# Patient Record
Sex: FEMALE | Race: BLACK OR AFRICAN AMERICAN | HISPANIC OR LATINO | Employment: FULL TIME | URBAN - METROPOLITAN AREA
[De-identification: names, ages, dates, MRNs, and addresses within clinical notes are randomized per-mention and may not be internally consistent; named-entity substitution may affect disease eponyms.]

---

## 2020-08-10 ENCOUNTER — HOSPITAL ENCOUNTER (EMERGENCY)
Facility: HOSPITAL | Age: 50
Discharge: HOME | End: 2020-08-10
Attending: EMERGENCY MEDICINE | Admitting: EMERGENCY MEDICINE

## 2020-08-10 VITALS
DIASTOLIC BLOOD PRESSURE: 80 MMHG | OXYGEN SATURATION: 100 % | RESPIRATION RATE: 16 BRPM | HEART RATE: 62 BPM | TEMPERATURE: 98.8 F | WEIGHT: 234 LBS | SYSTOLIC BLOOD PRESSURE: 144 MMHG

## 2020-08-10 DIAGNOSIS — R11.0 NAUSEA: Primary | ICD-10-CM

## 2020-08-10 DIAGNOSIS — E87.1 HYPONATREMIA: ICD-10-CM

## 2020-08-10 DIAGNOSIS — D64.9 ANEMIA: ICD-10-CM

## 2020-08-10 LAB
ALBUMIN SERPL BCP-MCNC: 3.8 G/DL (ref 3.5–5)
ALP SERPL-CCNC: 117 U/L (ref 46–116)
ALT SERPL W P-5'-P-CCNC: 18 U/L (ref 12–78)
ANION GAP SERPL CALCULATED.3IONS-SCNC: 5 MMOL/L (ref 4–13)
AST SERPL W P-5'-P-CCNC: 17 U/L (ref 5–45)
BASOPHILS # BLD AUTO: 0.04 THOUSANDS/ΜL (ref 0–0.1)
BASOPHILS NFR BLD AUTO: 0 % (ref 0–1)
BILIRUB SERPL-MCNC: 0.26 MG/DL (ref 0.2–1)
BILIRUB UR QL STRIP: NEGATIVE
BUN SERPL-MCNC: 10 MG/DL (ref 5–25)
CALCIUM SERPL-MCNC: 8.9 MG/DL (ref 8.3–10.1)
CHLORIDE SERPL-SCNC: 98 MMOL/L (ref 100–108)
CLARITY UR: NORMAL
CO2 SERPL-SCNC: 28 MMOL/L (ref 21–32)
COLOR UR: YELLOW
CREAT SERPL-MCNC: 0.78 MG/DL (ref 0.6–1.3)
EOSINOPHIL # BLD AUTO: 0.32 THOUSAND/ΜL (ref 0–0.61)
EOSINOPHIL NFR BLD AUTO: 3 % (ref 0–6)
ERYTHROCYTE [DISTWIDTH] IN BLOOD BY AUTOMATED COUNT: 17.5 % (ref 11.6–15.1)
EXT PREG TEST URINE: NEGATIVE
EXT. CONTROL ED NAV: NORMAL
GFR SERPL CREATININE-BSD FRML MDRD: 104 ML/MIN/1.73SQ M
GLUCOSE SERPL-MCNC: 88 MG/DL (ref 65–140)
GLUCOSE UR STRIP-MCNC: NEGATIVE MG/DL
HCT VFR BLD AUTO: 34.3 % (ref 34.8–46.1)
HGB BLD-MCNC: 9.7 G/DL (ref 11.5–15.4)
HGB UR QL STRIP.AUTO: NEGATIVE
IMM GRANULOCYTES # BLD AUTO: 0.02 THOUSAND/UL (ref 0–0.2)
IMM GRANULOCYTES NFR BLD AUTO: 0 % (ref 0–2)
KETONES UR STRIP-MCNC: NEGATIVE MG/DL
LEUKOCYTE ESTERASE UR QL STRIP: NEGATIVE
LIPASE SERPL-CCNC: 137 U/L (ref 73–393)
LYMPHOCYTES # BLD AUTO: 2.19 THOUSANDS/ΜL (ref 0.6–4.47)
LYMPHOCYTES NFR BLD AUTO: 19 % (ref 14–44)
MCH RBC QN AUTO: 19.9 PG (ref 26.8–34.3)
MCHC RBC AUTO-ENTMCNC: 28.3 G/DL (ref 31.4–37.4)
MCV RBC AUTO: 70 FL (ref 82–98)
MONOCYTES # BLD AUTO: 0.77 THOUSAND/ΜL (ref 0.17–1.22)
MONOCYTES NFR BLD AUTO: 7 % (ref 4–12)
NEUTROPHILS # BLD AUTO: 8.04 THOUSANDS/ΜL (ref 1.85–7.62)
NEUTS SEG NFR BLD AUTO: 71 % (ref 43–75)
NITRITE UR QL STRIP: NEGATIVE
NRBC BLD AUTO-RTO: 0 /100 WBCS
PH UR STRIP.AUTO: 7 [PH] (ref 4.5–8)
PLATELET # BLD AUTO: 459 THOUSANDS/UL (ref 149–390)
PMV BLD AUTO: 10.6 FL (ref 8.9–12.7)
POTASSIUM SERPL-SCNC: 3.6 MMOL/L (ref 3.5–5.3)
PROT SERPL-MCNC: 8.3 G/DL (ref 6.4–8.2)
PROT UR STRIP-MCNC: NEGATIVE MG/DL
RBC # BLD AUTO: 4.88 MILLION/UL (ref 3.81–5.12)
SODIUM SERPL-SCNC: 131 MMOL/L (ref 136–145)
SP GR UR STRIP.AUTO: 1.02 (ref 1–1.03)
UROBILINOGEN UR QL STRIP.AUTO: 0.2 E.U./DL
WBC # BLD AUTO: 11.38 THOUSAND/UL (ref 4.31–10.16)

## 2020-08-10 PROCEDURE — 83690 ASSAY OF LIPASE: CPT | Performed by: EMERGENCY MEDICINE

## 2020-08-10 PROCEDURE — 81025 URINE PREGNANCY TEST: CPT | Performed by: PHYSICIAN ASSISTANT

## 2020-08-10 PROCEDURE — 85025 COMPLETE CBC W/AUTO DIFF WBC: CPT | Performed by: EMERGENCY MEDICINE

## 2020-08-10 PROCEDURE — 83540 ASSAY OF IRON: CPT | Performed by: PHYSICIAN ASSISTANT

## 2020-08-10 PROCEDURE — 36415 COLL VENOUS BLD VENIPUNCTURE: CPT

## 2020-08-10 PROCEDURE — 99284 EMERGENCY DEPT VISIT MOD MDM: CPT | Performed by: PHYSICIAN ASSISTANT

## 2020-08-10 PROCEDURE — 83550 IRON BINDING TEST: CPT | Performed by: PHYSICIAN ASSISTANT

## 2020-08-10 PROCEDURE — 99284 EMERGENCY DEPT VISIT MOD MDM: CPT

## 2020-08-10 PROCEDURE — 81003 URINALYSIS AUTO W/O SCOPE: CPT

## 2020-08-10 PROCEDURE — 82728 ASSAY OF FERRITIN: CPT | Performed by: PHYSICIAN ASSISTANT

## 2020-08-10 PROCEDURE — 80053 COMPREHEN METABOLIC PANEL: CPT | Performed by: EMERGENCY MEDICINE

## 2020-08-10 RX ORDER — ONDANSETRON 4 MG/1
4 TABLET, FILM COATED ORAL EVERY 6 HOURS
Qty: 12 TABLET | Refills: 0 | Status: SHIPPED | OUTPATIENT
Start: 2020-08-10

## 2020-08-10 RX ORDER — FERROUS SULFATE 325(65) MG
325 TABLET ORAL DAILY
Qty: 30 TABLET | Refills: 0 | Status: SHIPPED | OUTPATIENT
Start: 2020-08-10

## 2020-08-10 RX ORDER — ONDANSETRON 4 MG/1
4 TABLET, ORALLY DISINTEGRATING ORAL ONCE
Status: COMPLETED | OUTPATIENT
Start: 2020-08-10 | End: 2020-08-10

## 2020-08-10 RX ORDER — ONDANSETRON 4 MG/1
4 TABLET, FILM COATED ORAL EVERY 6 HOURS
Qty: 12 TABLET | Refills: 0 | Status: SHIPPED | OUTPATIENT
Start: 2020-08-10 | End: 2020-08-10 | Stop reason: SDUPTHER

## 2020-08-10 RX ADMIN — ONDANSETRON 4 MG: 4 TABLET, ORALLY DISINTEGRATING ORAL at 18:20

## 2020-08-10 NOTE — ED PROVIDER NOTES
History  Chief Complaint   Patient presents with    Abdominal Pain     Pt reports feeling nausea and a upset stomach that started last night  Pt did not try any medications to help with the pain  Pt wants to be tested for covid and also reports "spots on her knee"     Patient present and emergency room with a 2 hour history of lower abdominal pain and nausea  She denies any urinary frequency, urgency, dysuria, hematuria  It has been moving her bowels normally  No blood or mucus  She complains of an itchy throat but no sore throat pain  She denies any coughing symptoms, nasal congestion or postnasal drip  She denies any your pain  He describes the pain as a generalized cramping at times that waxes and wanes  Patient has a past surgical history of a previous  9 years ago  She states there is no chance she is pregnant as she feels that she has gone through menopause  Past medical history is negative  Patient was questioning the need for a coronavirus test   She has no symptoms and has not had any exposure recently to any corona patient's  Her last exposure was 3 weeks ago and she is asymptomatic      History provided by:  Patient   used: Yes ([phone] -Pad)    Nausea   The primary symptoms include abdominal pain, nausea and arthralgias  Primary symptoms do not include fever, weight loss, fatigue, vomiting, diarrhea, melena, hematemesis, jaundice, hematochezia, dysuria, myalgias or rash  The illness began today  The onset was gradual    The abdominal pain began today  Pain location: Lower  The abdominal pain does not radiate  The abdominal pain is relieved by nothing  The illness does not include chills, anorexia, dysphagia, odynophagia, bloating, constipation, tenesmus or itching   Associated medical issues do not include inflammatory bowel disease, GERD, gallstones, liver disease, alcohol abuse, PUD, gastric bypass, bowel resection, irritable bowel syndrome, hemorrhoids or diverticulitis  Risk factors for a gastrointestinal illness include travel to endemic areas, high risk sexual activity, alcohol use and intravenous drug use  None       History reviewed  No pertinent past medical history  Past Surgical History:   Procedure Laterality Date     SECTION         History reviewed  No pertinent family history  I have reviewed and agree with the history as documented  E-Cigarette/Vaping    E-Cigarette Use Never User      E-Cigarette/Vaping Substances     Social History     Tobacco Use    Smoking status: Never Smoker    Smokeless tobacco: Never Used   Substance Use Topics    Alcohol use: Never     Frequency: Never    Drug use: Never       Review of Systems   Constitutional: Negative for activity change, appetite change, chills, diaphoresis, fatigue, fever and weight loss  HENT: Negative for congestion, dental problem, ear discharge, ear pain, mouth sores, postnasal drip, rhinorrhea, sore throat and trouble swallowing  Eyes: Negative for pain, discharge, redness and itching  Respiratory: Negative for cough, chest tightness, shortness of breath and wheezing  Cardiovascular: Negative for chest pain and leg swelling  Gastrointestinal: Positive for abdominal pain and nausea  Negative for anorexia, bloating, constipation, diarrhea, dysphagia, hematemesis, hematochezia, jaundice, melena and vomiting  Genitourinary: Negative for dysuria, frequency, hematuria and urgency  Musculoskeletal: Positive for arthralgias  Negative for myalgias  Skin: Negative for color change, itching and rash  Patient occasionally has a rash on both flexor surfaces of both elbows and knees  Neurological: Negative for weakness  Psychiatric/Behavioral: Negative for confusion  All other systems reviewed and are negative  Physical Exam  Physical Exam  Vitals signs and nursing note reviewed  Constitutional:       General: She is not in acute distress  Appearance: She is well-developed  She is obese  She is not ill-appearing, toxic-appearing or diaphoretic  HENT:      Head: Normocephalic and atraumatic  Mouth/Throat:      Pharynx: No pharyngeal swelling or oropharyngeal exudate  Cardiovascular:      Rate and Rhythm: Normal rate and regular rhythm  Heart sounds: No murmur  Abdominal:      General: Abdomen is flat  There is no distension  There are no signs of injury  Palpations: Abdomen is soft  Tenderness: There is no abdominal tenderness  There is no guarding or rebound  Negative signs include Guerra's sign  Skin:     General: Skin is warm  Capillary Refill: Capillary refill takes less than 2 seconds  Neurological:      General: No focal deficit present  Mental Status: She is alert and oriented to person, place, and time  Psychiatric:         Mood and Affect: Mood normal          Behavior: Behavior normal          Vital Signs  ED Triage Vitals [08/10/20 1617]   Temperature Pulse Respirations Blood Pressure SpO2   98 8 °F (37 1 °C) 81 20 134/63 100 %      Temp Source Heart Rate Source Patient Position - Orthostatic VS BP Location FiO2 (%)   Oral Monitor Sitting Left arm --      Pain Score       --           Vitals:    08/10/20 1617 08/10/20 2043   BP: 134/63 144/80   Pulse: 81 62   Patient Position - Orthostatic VS: Sitting Sitting         Visual Acuity      ED Medications  Medications   ondansetron (ZOFRAN-ODT) dispersible tablet 4 mg (4 mg Oral Given 8/10/20 1820)       Diagnostic Studies  Results Reviewed     Procedure Component Value Units Date/Time    POCT pregnancy, urine [[id number]]  (Normal) Resulted:  08/10/20 1936    Lab Status:  Final result Updated:  08/10/20 1936     EXT PREG TEST UR (Ref: Negative) Negative     Control Valid    Iron Saturation % [[id number]] Collected:  08/10/20 1936    Lab Status:   In process Specimen:  Blood Updated:  08/10/20 1936    Ferritin [[id number]] Collected:  08/10/20 1936    Lab Status:   In process Specimen:  Blood Updated:  08/10/20 1936    Urine Macroscopic, POC [[id number]] Collected:  08/10/20 1951    Lab Status:  Final result Specimen:  Urine Updated:  08/10/20 1935     Color, UA Yellow     Clarity, UA Slightly Cloudy     pH, UA 7 0     Leukocytes, UA Negative     Nitrite, UA Negative     Protein, UA Negative mg/dl      Glucose, UA Negative mg/dl      Ketones, UA Negative mg/dl      Urobilinogen, UA 0 2 E U /dl      Bilirubin, UA Negative     Blood, UA Negative     Specific Gravity, UA 1 020    Narrative:       CLINITEK RESULT    Comprehensive metabolic panel [[id number]]  (Abnormal) Collected:  08/10/20 1731    Lab Status:  Final result Specimen:  Blood from Arm, Right Updated:  08/10/20 1807     Sodium 131 mmol/L      Potassium 3 6 mmol/L      Chloride 98 mmol/L      CO2 28 mmol/L      ANION GAP 5 mmol/L      BUN 10 mg/dL      Creatinine 0 78 mg/dL      Glucose 88 mg/dL      Calcium 8 9 mg/dL      AST 17 U/L      ALT 18 U/L      Alkaline Phosphatase 117 U/L      Total Protein 8 3 g/dL      Albumin 3 8 g/dL      Total Bilirubin 0 26 mg/dL      eGFR 104 ml/min/1 73sq m     Narrative:       [facility] guidelines for Chronic Kidney Disease (CKD):     Stage 1 with normal or high GFR (GFR > 90 mL/min/1 73 square meters)    Stage 2 Mild CKD (GFR = 60-89 mL/min/1 73 square meters)    Stage 3A Moderate CKD (GFR = 45-59 mL/min/1 73 square meters)    Stage 3B Moderate CKD (GFR = 30-44 mL/min/1 73 square meters)    Stage 4 Severe CKD (GFR = 15-29 mL/min/1 73 square meters)    Stage 5 End Stage CKD (GFR <15 mL/min/1 73 square meters)  Note: GFR calculation is accurate only with a steady state creatinine    Lipase [[id number]]  (Normal) Collected:  08/10/20 1731    Lab Status:  Final result Specimen:  Blood from Arm, Right Updated:  08/10/20 1807     Lipase 137 u/L     CBC and differential [[id number]]  (Abnormal) Collected:  08/10/20 1731    Lab Status:  Final result Specimen:  Blood from Arm, Right Updated:  08/10/20 1752     WBC 11 38 Thousand/uL      RBC 4 88 Million/uL      Hemoglobin 9 7 g/dL      Hematocrit 34 3 %      MCV 70 fL      MCH 19 9 pg      MCHC 28 3 g/dL      RDW 17 5 %      MPV 10 6 fL      Platelets 039 Thousands/uL      nRBC 0 /100 WBCs      Neutrophils Relative 71 %      Immat GRANS % 0 %      Lymphocytes Relative 19 %      Monocytes Relative 7 %      Eosinophils Relative 3 %      Basophils Relative 0 %      Neutrophils Absolute 8 04 Thousands/µL      Immature Grans Absolute 0 02 Thousand/uL      Lymphocytes Absolute 2 19 Thousands/µL      Monocytes Absolute 0 77 Thousand/µL      Eosinophils Absolute 0 32 Thousand/µL      Basophils Absolute 0 04 Thousands/µL                  No orders to display              Procedures  Procedures         ED Course  ED Course as of Aug 10 2050   Mon Aug 10, 2020   2037 Laboratory studies were reviewed with the patient through  [facility] at [id number]  Patient is aware of her anemia  She states she has had this before  She was placed on iron and iron panel was ordered  Patient was given a referral to follow up with the [facility]  Patient states that she has had heavy periods in the past but now her periods are regular mg has 1 every 3 months  Her pregnancy test was negative  She also states that she drinks a large amount of water every day  I asked her to cut her water intake and half  She will need to have her sodium followed up with the [facility] as well  She was given reasons to return to the emergency room should her symptoms worsen  US AUDIT      Most Recent Value   Initial Alcohol Screen: US AUDIT-C    1  How often do you have a drink containing alcohol?  0 Filed at: 08/10/2020 1621   2  How many drinks containing alcohol do you have on a typical day you are drinking? 0 Filed at: 08/10/2020 1621   3b  FEMALE Any Age, or MALE 65+:  How often do you have 4 or more drinks on one occassion? 0 Filed at: 08/10/2020 1621   Audit-C Score  0 Filed at: 08/10/2020 1621                  PEDRO/DAST-10      Most Recent Value   How many times in the past year have you    Used an illegal drug or used a prescription medication for non-medical reasons? Never Filed at: 08/10/2020 1621                                MDM  Number of Diagnoses or Management Options  Anemia: new and requires workup  Hyponatremia: new and requires workup  Nausea: new and requires workup     Amount and/or Complexity of Data Reviewed  Clinical lab tests: ordered and reviewed  Tests in the radiology section of CPT®: ordered and reviewed  Tests in the medicine section of CPT®: ordered and reviewed    Risk of Complications, Morbidity, and/or Mortality  Presenting problems: high  Diagnostic procedures: high  Management options: high  General comments: Patient presents emergency room with complaints of nausea and abdominal pain  She was seen and evaluated  Laboratory studies were taken and were reviewed  It did show her to have a microcytic hypochromic anemia  An iron panel was ordered and is pending  The patient told me that she does have a history of anemia  She is starting to go through menopause and has irregular periods  She was started on ferrous sulfate, 1 pill daily for the next 30 days  She also had a sodium of 130 and admitted to drinking more than a gal of water per day  I have asked her to cut her water intake to 50%  She is going to call and make a follow-up appointment with the [facility] as she does not have a physician  She was given reasons to return to the emergency room should her symptoms worsen      Patient Progress  Patient progress: stable        Disposition  Final diagnoses:   Nausea   Hyponatremia   Anemia     Time reflects when diagnosis was documented in both MDM as applicable and the Disposition within this note     Time User Action Codes Description Comment    8/10/2020  8:16 PM Silvia Do [R11 0] Nausea     8/10/2020  8:16 PM Rosalene Hoots Add [E87 1] Hyponatremia     8/10/2020  8:17 PM Rosalene Hoots Add [D64 9] Anemia       ED Disposition     ED Disposition Condition Date/Time Comment    Discharge Stable Mon Aug 10, 2020  8:16 PM Nathaniel Preston discharge to home/self care  Follow-up Information     Follow up With Specialties Details Why [address]  Schedule an appointment as soon as possible for a visit in 1 week  [address] [phone] [phone]          Patient's Medications   Discharge Prescriptions    FERROUS SULFATE 325 (65 FE) MG TABLET    Take 1 tablet (325 mg total) by mouth daily       Start Date: 8/10/2020 End Date: --       Order Dose: 325 mg       Quantity: 30 tablet    Refills: 0    ONDANSETRON (ZOFRAN) 4 MG TABLET    Take 1 tablet (4 mg total) by mouth every 6 (six) hours       Start Date: 8/10/2020 End Date: --       Order Dose: 4 mg       Quantity: 12 tablet    Refills: 0     No discharge procedures on file      PDMP Review     None          ED Provider  Electronically Signed by           Diamond Greer PA-C  08/10/20 2052

## 2020-08-10 NOTE — Clinical Note
Kartik Meléndez was seen and treated in our emergency department on 8/10/2020  Diagnosis:     Janeth Washburn    She may return on 08/12/2020  If you have any questions or concerns, please don't hesitate to call        Shi Zepeda MD    ______________________________           _______________          _______________  Hospital Representative                              Date                                Time

## 2020-08-11 LAB
FERRITIN SERPL-MCNC: 5 NG/ML (ref 8–388)
IRON SATN MFR SERPL: 6 %
IRON SERPL-MCNC: 24 UG/DL (ref 50–170)
TIBC SERPL-MCNC: 403 UG/DL (ref 250–450)

## 2023-08-04 ENCOUNTER — OFFICE VISIT (OUTPATIENT)
Dept: FAMILY MEDICINE CLINIC | Facility: CLINIC | Age: 53
End: 2023-08-04
Payer: COMMERCIAL

## 2023-08-04 VITALS
BODY MASS INDEX: 46.63 KG/M2 | SYSTOLIC BLOOD PRESSURE: 126 MMHG | TEMPERATURE: 98.5 F | DIASTOLIC BLOOD PRESSURE: 78 MMHG | HEART RATE: 84 BPM | OXYGEN SATURATION: 98 % | WEIGHT: 247 LBS | HEIGHT: 61 IN

## 2023-08-04 DIAGNOSIS — R73.09 ELEVATED GLUCOSE: ICD-10-CM

## 2023-08-04 DIAGNOSIS — Z12.31 BREAST CANCER SCREENING BY MAMMOGRAM: ICD-10-CM

## 2023-08-04 DIAGNOSIS — Z00.00 WELL ADULT EXAM: Primary | ICD-10-CM

## 2023-08-04 DIAGNOSIS — Z12.11 COLON CANCER SCREENING: ICD-10-CM

## 2023-08-04 DIAGNOSIS — Z11.59 NEED FOR HEPATITIS C SCREENING TEST: ICD-10-CM

## 2023-08-04 DIAGNOSIS — N92.6 ABNORMAL MENSTRUAL PERIODS: ICD-10-CM

## 2023-08-04 DIAGNOSIS — Z11.4 SCREENING FOR HIV (HUMAN IMMUNODEFICIENCY VIRUS): ICD-10-CM

## 2023-08-04 DIAGNOSIS — E78.00 HYPERCHOLESTEREMIA: ICD-10-CM

## 2023-08-04 PROCEDURE — 99386 PREV VISIT NEW AGE 40-64: CPT | Performed by: FAMILY MEDICINE

## 2023-08-04 RX ORDER — MULTIVIT-MIN/IRON FUM/FOLIC AC 7.5 MG-4
1 TABLET ORAL DAILY
COMMUNITY

## 2023-08-04 NOTE — PROGRESS NOTES
[facility]ACT[facility]    NAME: Puja Meléndez  AGE: 48 y.o. SEX: female  : 1970     DATE: 2023     Assessment and Plan:     BMI Counseling: Body mass index is 47.44 kg/m². The BMI is above normal. Nutrition recommendations include reducing portion sizes, reducing fast food intake, consuming healthier snacks, decreasing soda and/or juice intake, moderation in carbohydrate intake and increasing intake of lean protein. Problem List Items Addressed This Visit    None  Visit Diagnoses     Well adult exam    -  Primary    Breast cancer screening by mammogram        Relevant Orders    Mammo screening bilateral w 3d & cad    Colon cancer screening        Relevant Orders    Cologuard    Need for hepatitis C screening test        Relevant Orders    Hepatitis C Antibody    Screening for HIV (human immunodeficiency virus)        Relevant Orders    HIV 1/2 AG/AB w Reflex [facility] for 2 yr old and above    Abnormal menstrual periods        Relevant Orders    Comprehensive metabolic panel    CBC and Platelet    Ambulatory Referral to Obstetrics / Gynecology    TSH, 3rd generation    Hypercholesteremia        Relevant Orders    Lipid panel    Elevated glucose        Relevant Orders    HEMOGLOBIN A1C W/ EAG ESTIMATION          Immunizations and preventive care screenings were discussed with patient today. Appropriate education was printed on patient's after visit summary. Counseling:  Alcohol/drug use: discussed moderation in alcohol intake, the recommendations for healthy alcohol use, and avoidance of illicit drug use. Dental Health: discussed importance of regular tooth brushing, flossing, and dental visits. · Exercise: the importance of regular exercise/physical activity was discussed. Recommend exercise 3-5 times per week for at least 30 minutes. BMI Counseling: Body mass index is 47.44 kg/m².  The BMI is above normal. Nutrition recommendations include decreasing portion sizes, limiting drinks that contain sugar, moderation in carbohydrate intake and increasing intake of lean protein. Rationale for BMI follow-up plan is due to patient being overweight or obese. Depression Screening and Follow-up Plan: Patient was screened for depression during today's encounter. They screened negative with a PHQ-2 score of 0. No follow-ups on file. Chief Complaint:     Chief Complaint   Patient presents with   • Physical Exam   • Well Check     Still has heavy menstruals cycles every 3-4 months/has a vein on left breast that swells and causes pain at times. History of Present Illness:   Physical, annual.    Adult Annual Physical   Patient here for a comprehensive physical exam. The patient reports problems - heavy menses. Diet and Physical Activity  · Diet/Nutrition: poor diet. · Exercise: no formal exercise. Depression Screening  PHQ-2/9 Depression Screening    Little interest or pleasure in doing things: 0 - not at all  Feeling down, depressed, or hopeless: 0 - not at all  PHQ-2 Score: 0  PHQ-2 Interpretation: Negative depression screen       General Health  · Sleep: gets 4-6 hours of sleep on average. · Hearing: normal - bilateral.  · Vision: wears glasses. · Dental: no dental visits for >1 year. /GYN Health  · Patient is: perimenopausal  · Last menstrual period: q 3 - 4 months  · Contraceptive method: not needed. .     Review of Systems:     Review of Systems   Constitutional: Negative. HENT: Negative. Eyes: Negative. Respiratory: Negative. Cardiovascular: Negative. Gastrointestinal: Negative. Genitourinary: Negative. Musculoskeletal: Negative. Skin: Negative. Neurological: Negative. Psychiatric/Behavioral: Negative. Past Medical History:     History reviewed. No pertinent past medical history.    Past Surgical History:     Past Surgical History:   Procedure Laterality Date   •  SECTION        Social History:     Social History     Socioeconomic History   • Marital status: Single     Spouse name: None   • Number of children: None   • Years of education: None   • Highest education level: None   Occupational History   • None   Tobacco Use   • Smoking status: Never   • Smokeless tobacco: Never   Vaping Use   • Vaping Use: Never used   Substance and Sexual Activity   • Alcohol use: Never   • Drug use: Never   • Sexual activity: None   Other Topics Concern   • None   Social History Narrative   • None     Social Determinants of Health     Financial Resource Strain: Not on file   Food Insecurity: Not on file   Transportation Needs: Not on file   Physical Activity: Not on file   Stress: Not on file   Social Connections: Not on file   Intimate Partner Violence: Not on file   Housing Stability: Not on file      Family History:     Family History   Problem Relation Age of Onset   • Heart disease Mother    • Hypertension Mother    • Hyperlipidemia Mother    • Diabetes Mother    • Diabetes Father    • Heart disease Father    • Hypertension Father    • Alzheimer's disease Father    • Rheum arthritis Father    • Lupus Sister    • Cancer Sister    • Diabetes Brother       Current Medications:     Current Outpatient Medications   Medication Sig Dispense Refill   • Multiple Vitamins-Minerals (multivitamin with minerals) tablet Take 1 tablet by mouth daily     • ferrous sulfate 325 (65 Fe) mg tablet Take 1 tablet (325 mg total) by mouth daily (Patient not taking: Reported on 2023) 30 tablet 0   • ondansetron (ZOFRAN) 4 mg tablet Take 1 tablet (4 mg total) by mouth every 6 (six) hours (Patient not taking: Reported on 2023) 12 tablet 0     No current facility-administered medications for this visit.       Allergies:     No Known Allergies   Physical Exam:     /78 (BP Location: Left arm, Patient Position: Sitting, Cuff Size: Large)   Pulse 84   Temp 98.5 °F (36.9 °C) (Oral)   Ht 5' 0.5" (1.537 m)   Wt 112 kg (247 lb)   LMP 04/03/2023 Comment: heavy  SpO2 98%   BMI 47.44 kg/m²     Physical Exam  Vitals and nursing note reviewed. Constitutional:       General: She is not in acute distress. Appearance: She is well-developed. HENT:      Head: Normocephalic and atraumatic. Right Ear: Tympanic membrane, ear canal and external ear normal.      Left Ear: Tympanic membrane, ear canal and external ear normal.      Mouth/Throat:      Mouth: Mucous membranes are moist.      Pharynx: Oropharynx is clear. Eyes:      Conjunctiva/sclera: Conjunctivae normal.      Pupils: Pupils are equal, round, and reactive to light. Cardiovascular:      Rate and Rhythm: Normal rate and regular rhythm. Pulses: Normal pulses. Heart sounds: Normal heart sounds. No murmur heard. Pulmonary:      Effort: Pulmonary effort is normal. No respiratory distress. Breath sounds: Normal breath sounds. Abdominal:      General: Abdomen is flat. Bowel sounds are normal.      Palpations: Abdomen is soft. Tenderness: There is no abdominal tenderness. Musculoskeletal:         General: No swelling. Cervical back: Neck supple. Skin:     General: Skin is warm and dry. Capillary Refill: Capillary refill takes less than 2 seconds. Neurological:      Mental Status: She is alert and oriented to person, place, and time. Psychiatric:         Mood and Affect: Mood normal.         Thought Content:  Thought content normal.         Judgment: Judgment normal.          Shahzad Bustos DO  [address] [place] [address]

## 2023-10-16 ENCOUNTER — OFFICE VISIT (OUTPATIENT)
Dept: FAMILY MEDICINE CLINIC | Facility: CLINIC | Age: 53
End: 2023-10-16
Payer: COMMERCIAL

## 2023-10-16 VITALS
WEIGHT: 249.6 LBS | BODY MASS INDEX: 47.13 KG/M2 | RESPIRATION RATE: 18 BRPM | HEART RATE: 111 BPM | HEIGHT: 61 IN | SYSTOLIC BLOOD PRESSURE: 138 MMHG | DIASTOLIC BLOOD PRESSURE: 86 MMHG | TEMPERATURE: 98.6 F | OXYGEN SATURATION: 96 %

## 2023-10-16 DIAGNOSIS — Z00.00 HEALTH CARE MAINTENANCE: ICD-10-CM

## 2023-10-16 DIAGNOSIS — G89.29 CHRONIC PAIN OF BOTH SHOULDERS: ICD-10-CM

## 2023-10-16 DIAGNOSIS — M25.512 CHRONIC PAIN OF BOTH SHOULDERS: ICD-10-CM

## 2023-10-16 DIAGNOSIS — M25.511 CHRONIC PAIN OF BOTH SHOULDERS: ICD-10-CM

## 2023-10-16 DIAGNOSIS — F33.0 MILD EPISODE OF RECURRENT MAJOR DEPRESSIVE DISORDER (HCC): Primary | ICD-10-CM

## 2023-10-16 DIAGNOSIS — R32 URINARY INCONTINENCE, UNSPECIFIED TYPE: ICD-10-CM

## 2023-10-16 DIAGNOSIS — L70.0 COMEDONE: ICD-10-CM

## 2023-10-16 PROCEDURE — 99215 OFFICE O/P EST HI 40 MIN: CPT | Performed by: FAMILY MEDICINE

## 2023-10-16 RX ORDER — ALUMINUM ZIRCONIUM TETRACHLOROHYDREX GLY 19.2 G/100G
STICK TOPICAL DAILY
Qty: 100 EACH | Refills: 5 | Status: SHIPPED | OUTPATIENT
Start: 2023-10-16

## 2023-10-16 RX ORDER — ALUMINUM ZIRCONIUM TETRACHLOROHYDREX GLY 19.2 G/100G
STICK TOPICAL DAILY
Qty: 100 EACH | Refills: 5 | Status: SHIPPED | OUTPATIENT
Start: 2023-10-16 | End: 2023-10-16 | Stop reason: SDUPTHER

## 2023-10-16 NOTE — PROGRESS NOTES
Name: Prashanth Sam      : 1970      MRN: [id number]  Encounter Provider: Juan Marshall DO  Encounter Date: 10/16/2023   Encounter department: [address]     XR's and ortho evaluation. GYN evaluation. Chief Complaint   Patient presents with    Hyperlipidemia    elevated glucose    Shoulder Pain    Depression     PHQ 2= 6; PHQ 9= 16    Anxiety     LEENA 7 =21    Breast Mass      1. Mild episode of recurrent major depressive disorder ([address])    2. Chronic pain of both shoulders  -     XR shoulder 2+ vw left; Future; Expected date: 10/16/2023  -     XR shoulder 2+ vw right; Future; Expected date: 10/16/2023  -     Ambulatory Referral to Orthopedic Surgery; Future    3. Health care maintenance  -     Ambulatory Referral to Obstetrics / Gynecology; Future    4. Urinary incontinence, unspecified type  -     UA w Reflex to Microscopic w Reflex to Culture -Lab Collect; Future; Expected date: 10/16/2023  -     Diapers & Supplies (Pampers UnderJams L/XL) MISC; Use in the morning    5. [facility]        Depression Screening and Follow-up Plan: Patient's depression screening was positive with a PHQ-2 score of 6. Their PHQ-9 score was 15. Patient assessed for underlying major depression. Brief counseling provided and recommend additional follow-up/re-evaluation next office visit. Subjective     Here for several concerns, sister with patient; darker skin on LE's- worried about circulation-, depression, weight gain, urine incontinent. Depressed since 2022 when told had no cartilage in shoulder's, has hard time picking up grand children. This was also the time started to have stool or urine leaking. 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 Had 2 vaginal deliveries and one . Hetal Andrews translated.     I have spent a total time of 40 minutes on 10/16/23 in caring for this patient including Instructions for management, Patient and family education, Impressions, and Counseling / Coordination of care. Review of Systems   Constitutional: Negative. HENT: Negative. Eyes: Negative. Respiratory: Negative. Cardiovascular: Negative. Gastrointestinal:         Can leak stool. And urine. Musculoskeletal:  Positive for arthralgias. Neurological: Negative. Psychiatric/Behavioral: Negative. No past medical history on file.   Past Surgical History:   Procedure Laterality Date     SECTION       Family History   Problem Relation Age of Onset    Heart disease Mother     Hypertension Mother     Hyperlipidemia Mother     Diabetes Mother     Diabetes Father     Heart disease Father     Hypertension Father     Alzheimer's disease Father     Rheum arthritis Father     Lupus Sister     Cancer Sister     Diabetes Brother      Social History     Socioeconomic History    Marital status: Single     Spouse name: None    Number of children: None    Years of education: None    Highest education level: None   Occupational History    None   Tobacco Use    Smoking status: Never    Smokeless tobacco: Never   Vaping Use    Vaping Use: Never used   Substance and Sexual Activity    Alcohol use: Never    Drug use: Never    Sexual activity: None   Other Topics Concern    None   Social History Narrative    None     Social Determinants of Health     Financial Resource Strain: Not on file   Food Insecurity: Not on file   Transportation Needs: Not on file   Physical Activity: Not on file   Stress: Not on file   Social Connections: Not on file   Intimate Partner Violence: Not on file   Housing Stability: Not on file     Current Outpatient Medications on File Prior to Visit   Medication Sig    ferrous sulfate 325 (65 Fe) mg tablet Take 1 tablet (325 mg total) by mouth daily (Patient not taking: Reported on 2023)    Multiple Vitamins-Minerals (multivitamin with minerals) tablet Take 1 tablet by mouth daily (Patient not taking: Reported on 10/16/2023)    ondansetron (Chinmay Mauro) 4 mg tablet Take 1 tablet (4 mg total) by mouth every 6 (six) hours (Patient not taking: Reported on 8/4/2023)     No Known Allergies    There is no immunization history on file for this patient. Objective     /86 (BP Location: Left arm, Patient Position: Sitting, Cuff Size: Large)   Pulse (!) 111   Temp 98.6 °F (37 °C) (Oral)   Resp 18   Ht 5' 0.5" (1.537 m)   Wt 113 kg (249 lb 9.6 oz)   SpO2 96%   BMI 47.94 kg/m²     Physical Exam  Constitutional:       Appearance: She is well-developed. HENT:      Head: Normocephalic and atraumatic. Right Ear: External ear normal.      Left Ear: External ear normal.      Nose: Nose normal.      Mouth/Throat:      Mouth: Mucous membranes are moist.      Pharynx: Oropharynx is clear. Eyes:      Conjunctiva/sclera: Conjunctivae normal.      Pupils: Pupils are equal, round, and reactive to light. Cardiovascular:      Rate and Rhythm: Normal rate and regular rhythm. Pulses: Normal pulses. Heart sounds: Normal heart sounds. Pulmonary:      Effort: Pulmonary effort is normal.      Breath sounds: Normal breath sounds. Abdominal:      General: Bowel sounds are normal.      Palpations: Abdomen is soft. Musculoskeletal:      Cervical back: Normal range of motion and neck supple. Comments: Abduction of shoulders slow but 180 degrees. Skin:     General: Skin is warm and dry. Comments: Some areas around the ankles have a darker skin color on top of brown skin. ACW - top of left breast -   Neurological:      Mental Status: She is alert and oriented to person, place, and time. Deep Tendon Reflexes: Reflexes are normal and symmetric. Psychiatric:         Mood and Affect: Mood normal.         Behavior: Behavior normal.         Thought Content:  Thought content normal.         Judgment: Judgment normal.       Kemi Araya,

## 2023-10-24 ENCOUNTER — APPOINTMENT (OUTPATIENT)
Dept: LAB | Facility: MEDICAL CENTER | Age: 53
End: 2023-10-24
Payer: COMMERCIAL

## 2023-10-24 DIAGNOSIS — R32 URINARY INCONTINENCE, UNSPECIFIED TYPE: ICD-10-CM

## 2023-10-24 DIAGNOSIS — Z11.4 SCREENING FOR HIV (HUMAN IMMUNODEFICIENCY VIRUS): ICD-10-CM

## 2023-10-24 DIAGNOSIS — N92.6 ABNORMAL MENSTRUAL PERIODS: ICD-10-CM

## 2023-10-24 DIAGNOSIS — R73.09 ELEVATED GLUCOSE: ICD-10-CM

## 2023-10-24 DIAGNOSIS — Z11.59 NEED FOR HEPATITIS C SCREENING TEST: ICD-10-CM

## 2023-10-24 DIAGNOSIS — E78.00 HYPERCHOLESTEREMIA: ICD-10-CM

## 2023-10-24 LAB
ALBUMIN SERPL BCP-MCNC: 4.3 G/DL (ref 3.5–5)
ALP SERPL-CCNC: 99 U/L (ref 34–104)
ALT SERPL W P-5'-P-CCNC: 11 U/L (ref 7–52)
ANION GAP SERPL CALCULATED.3IONS-SCNC: 7 MMOL/L
AST SERPL W P-5'-P-CCNC: 18 U/L (ref 13–39)
BACTERIA UR QL AUTO: NORMAL /HPF
BILIRUB SERPL-MCNC: 0.31 MG/DL (ref 0.2–1)
BILIRUB UR QL STRIP: NEGATIVE
BUN SERPL-MCNC: 9 MG/DL (ref 5–25)
CALCIUM SERPL-MCNC: 9.8 MG/DL (ref 8.4–10.2)
CHLORIDE SERPL-SCNC: 99 MMOL/L (ref 96–108)
CHOLEST SERPL-MCNC: 208 MG/DL
CLARITY UR: CLEAR
CO2 SERPL-SCNC: 32 MMOL/L (ref 21–32)
COLOR UR: YELLOW
CREAT SERPL-MCNC: 0.67 MG/DL (ref 0.6–1.3)
ERYTHROCYTE [DISTWIDTH] IN BLOOD BY AUTOMATED COUNT: 14.1 % (ref 11.6–15.1)
EST. AVERAGE GLUCOSE BLD GHB EST-MCNC: 148 MG/DL
GFR SERPL CREATININE-BSD FRML MDRD: 102 ML/MIN/1.73SQ M
GLUCOSE P FAST SERPL-MCNC: 84 MG/DL (ref 65–99)
GLUCOSE UR STRIP-MCNC: NEGATIVE MG/DL
HBA1C MFR BLD: 6.8 %
HCT VFR BLD AUTO: 42 % (ref 34.8–46.1)
HCV AB SER QL: NORMAL
HDLC SERPL-MCNC: 39 MG/DL
HGB BLD-MCNC: 12.7 G/DL (ref 11.5–15.4)
HGB UR QL STRIP.AUTO: NEGATIVE
HIV 1+2 AB+HIV1 P24 AG SERPL QL IA: NORMAL
HIV 2 AB SERPL QL IA: NORMAL
HIV1 AB SERPL QL IA: NORMAL
HIV1 P24 AG SERPL QL IA: NORMAL
KETONES UR STRIP-MCNC: NEGATIVE MG/DL
LDLC SERPL CALC-MCNC: 123 MG/DL (ref 0–100)
LEUKOCYTE ESTERASE UR QL STRIP: NEGATIVE
MCH RBC QN AUTO: 24.1 PG (ref 26.8–34.3)
MCHC RBC AUTO-ENTMCNC: 30.2 G/DL (ref 31.4–37.4)
MCV RBC AUTO: 80 FL (ref 82–98)
NITRITE UR QL STRIP: NEGATIVE
NON-SQ EPI CELLS URNS QL MICRO: NORMAL /HPF
NONHDLC SERPL-MCNC: 169 MG/DL
PH UR STRIP.AUTO: 6 [PH]
PLATELET # BLD AUTO: 407 THOUSANDS/UL (ref 149–390)
PMV BLD AUTO: 11 FL (ref 8.9–12.7)
POTASSIUM SERPL-SCNC: 4.2 MMOL/L (ref 3.5–5.3)
PROT SERPL-MCNC: 7.9 G/DL (ref 6.4–8.4)
PROT UR STRIP-MCNC: ABNORMAL MG/DL
RBC # BLD AUTO: 5.28 MILLION/UL (ref 3.81–5.12)
RBC #/AREA URNS AUTO: NORMAL /HPF
SODIUM SERPL-SCNC: 138 MMOL/L (ref 135–147)
SP GR UR STRIP.AUTO: 1.02 (ref 1–1.03)
TRIGL SERPL-MCNC: 232 MG/DL
TSH SERPL DL<=0.05 MIU/L-ACNC: 1.69 UIU/ML (ref 0.45–4.5)
UROBILINOGEN UR STRIP-ACNC: <2 MG/DL
WBC # BLD AUTO: 10.47 THOUSAND/UL (ref 4.31–10.16)
WBC #/AREA URNS AUTO: NORMAL /HPF

## 2023-10-24 PROCEDURE — 80061 LIPID PANEL: CPT

## 2023-10-24 PROCEDURE — 85027 COMPLETE CBC AUTOMATED: CPT

## 2023-10-24 PROCEDURE — 80053 COMPREHEN METABOLIC PANEL: CPT

## 2023-10-24 PROCEDURE — 81001 URINALYSIS AUTO W/SCOPE: CPT

## 2023-10-24 PROCEDURE — 83036 HEMOGLOBIN GLYCOSYLATED A1C: CPT

## 2023-10-24 PROCEDURE — 84443 ASSAY THYROID STIM HORMONE: CPT

## 2023-10-24 PROCEDURE — 36415 COLL VENOUS BLD VENIPUNCTURE: CPT

## 2023-10-24 PROCEDURE — 86803 HEPATITIS C AB TEST: CPT

## 2023-10-24 PROCEDURE — 87389 HIV-1 AG W/HIV-1&-2 AB AG IA: CPT

## 2023-10-25 ENCOUNTER — APPOINTMENT (OUTPATIENT)
Dept: RADIOLOGY | Facility: MEDICAL CENTER | Age: 53
End: 2023-10-25
Payer: COMMERCIAL

## 2023-10-25 ENCOUNTER — OFFICE VISIT (OUTPATIENT)
Dept: OBGYN CLINIC | Facility: MEDICAL CENTER | Age: 53
End: 2023-10-25
Payer: COMMERCIAL

## 2023-10-25 VITALS — WEIGHT: 249 LBS | HEIGHT: 61 IN | BODY MASS INDEX: 47.01 KG/M2

## 2023-10-25 DIAGNOSIS — M54.2 NECK PAIN: ICD-10-CM

## 2023-10-25 DIAGNOSIS — M25.511 CHRONIC PAIN OF BOTH SHOULDERS: ICD-10-CM

## 2023-10-25 DIAGNOSIS — G89.29 CHRONIC PAIN OF BOTH SHOULDERS: ICD-10-CM

## 2023-10-25 DIAGNOSIS — M25.512 CHRONIC PAIN OF BOTH SHOULDERS: ICD-10-CM

## 2023-10-25 DIAGNOSIS — M54.2 NECK PAIN: Primary | ICD-10-CM

## 2023-10-25 PROCEDURE — 99203 OFFICE O/P NEW LOW 30 MIN: CPT | Performed by: EMERGENCY MEDICINE

## 2023-10-25 PROCEDURE — 72040 X-RAY EXAM NECK SPINE 2-3 VW: CPT

## 2023-10-25 RX ORDER — IBUPROFEN 600 MG/1
600 TABLET ORAL EVERY 6 HOURS PRN
Qty: 30 TABLET | Refills: 1 | Status: SHIPPED | OUTPATIENT
Start: 2023-10-25

## 2023-10-25 NOTE — PATIENT INSTRUCTIONS
You may use Advil (ibuprofen) 400-600mg every 6 hours or at least twice per day OR Aleve (naproxen) 250-500mg every 12 hours as needed for pain and inflammation. You may also take Tylenol 500mg every 4-6 hours as needed OR max 1,000mg per dose up to 3 times per day for a total of 3,000mg per day  Check with your primary care physician to see if these medications are safe to take and to make sure they do not interfere with your other medications and medical issues. Tendinitis del manguito rotador   LO QUE NECESITA SABER:   La tendinitis del manguito rotador es la inflamación de los tendones de la articulación del hombro. Los tendones son cuerdas de tejido resistente que [employer] músculos a los [place]. El manguito rotador está formado por un ayo de músculos y tendones que mantienen la articulación del hombro en riddle lugar. INSTRUCCIONES SOBRE EL SAUD HOSPITALARIA:   Llame a riddle médico u ortopedista si:  Usted tiene falta de aire o dolor de pecho repentinos. Pierde la sensación, siente un hormigueo o se le pone fría, anna o pálida alguna parte de riddle brazo. Usted tiene dolor e inflamación en el hombro, incluso después de viki el medicamento para el dolor. Usted tiene comezón, hinchazón o sarpullido en riddle piel. Iveth síntomas no mejoran, o empeoran. Usted tiene preguntas o inquietudes acerca de riddle condición o cuidado. Medicamentos:  LAUREN pueden disminuir la inflamación y el dolor o la fiebre. Dolores medicamento está disponible con o sin virgen receta médica. Los LAUREN pueden causar sangrado estomacal o problemas renales en ciertas personas. Si usted peng un medicamento anticoagulante, siempre pregúntele a riddle médico si los LAUREN son seguros para usted. Siempre talib la etiqueta de dolores medicamento y [address] instrucciones. [place] iveth medicamentos lonnie se le haya indicado. Consulte con riddle médico si usted jolynn que riddle medicamento no le está ayudando o si presenta efectos secundarios.  Infórmele al médico si usted es alérgico a algún medicamento. Mantenga virgen lista actualizada de los May, las vitaminas y los productos herbales que peng. Incluya los siguientes datos de los medicamentos: cantidad, frecuencia y motivo de administración. Traiga con usted la lista o los envases de las píldoras a keesha citas de seguimiento. Lleve la lista de los medicamentos con usted en latasha de virgen emergencia. Cuidados personales:  Repose según le indicaron. Limite la actividad en el hombro afectado para reducir la presión en el tendón. Es posible que esto contribuya a evitar daños mayores, aliviar el dolor y a que se sane. Aplique hielo en la jhonathan del hombro. El hielo ayuda a disminuir la inflamación y el dolor. El hielo también puede contribuir a evitar el daño de los tejidos. Use virgen compresa de hielo o ponga hielo triturado en virgen bolsa de plástico. Envuelva el hielo con Trevor Ferrer y ciera sobre el hombro toñito 15 a 20 minutos cada hora o lonnie le indiquen. Mantenga el hombro en la posición correcta para que se sane más rápidamente. Para lograrlo, puede subir la altura de los brazos de los asientos cuando Angelita Brand y está sentado. Trate de no dormir del lado del hombro lesionado. Si es [place], use un brasier deportivo para que los tirantes estén más cerca de riddle fabiana. [place] puede quitar presión del hombro afectado. La fisioterapia puede ayudar a mejorar el movimiento y la fuerza a la vez que disminuye el dolor. El fisioterapeuta le enseñará ejercicios seguros. Es posible que con estos ejercicios pueda volver a  el hombro de forma normal y fortalecer el manguito rotador. Es posible que aprenda a modificar la manera en que hace keesha actividades diarias para ayudar a reducir la tensión en los tendones del hombro. Acuda a keesha consultas de control con riddle médico u ortopedista según le indicaron: Anote keesha preguntas para que se acuerde de hacerlas toñito keesha visitas.   © Copyright Merative 2023 Information is for End User's use only and may not be sold, redistributed or otherwise used for commercial purposes. Esta información es sólo para uso en educación. Riddle intención no es darle un consejo médico sobre enfermedades o tratamientos. Colsulte con riddle Camella Cradle farmacéutico antes de seguir cualquier régimen médico para saber si es seguro y efectivo para usted.

## 2023-10-25 NOTE — PROGRESS NOTES
Assessment/Plan:    Diagnoses and all orders for this visit:    Neck pain  -     XR spine cervical 2 or 3 vw injury; Future  -     Ambulatory Referral to Physical Therapy; Future  -     ibuprofen (MOTRIN) 600 mg tablet; Take 1 tablet (600 mg total) by mouth every 6 (six) hours as needed for moderate pain    Chronic pain of both shoulders  -     Ambulatory Referral to Orthopedic Surgery  -     XR spine cervical 2 or 3 vw injury; Future  -     Ambulatory Referral to Physical Therapy; Future  -     ibuprofen (MOTRIN) 600 mg tablet; Take 1 tablet (600 mg total) by mouth every 6 (six) hours as needed for moderate pain    Prior evaluation in [place] not available   T/c Xrays B/L Shoulders   ([id number]) used for entire encounter    Return in about 6 weeks (around 12/6/2023). Subjective:   Patient ID: William Mata is a 48 y.o. female. 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 She has been seen before in [place] for these symptoms. Review of Systems    The following portions of the patient's chart were reviewed and updated as appropriate:    Allergy:  No Known Allergies    Medications:    Current Outpatient Medications:     Diapers & Supplies (Pampers UnderJams L/XL) MISC, Use in the morning, Disp: 100 each, Rfl: 5    ibuprofen (MOTRIN) 600 mg tablet, Take 1 tablet (600 mg total) by mouth every 6 (six) hours as needed for moderate pain, Disp: 30 tablet, Rfl: 1    ferrous sulfate 325 (65 Fe) mg tablet, Take 1 tablet (325 mg total) by mouth daily (Patient not taking: Reported on 8/4/2023), Disp: 30 tablet, Rfl: 0    Multiple Vitamins-Minerals (multivitamin with minerals) tablet, Take 1 tablet by mouth daily (Patient not taking: Reported on 10/16/2023), Disp: , Rfl:     ondansetron (ZOFRAN) 4 mg tablet, Take 1 tablet (4 mg total) by mouth every 6 (six) hours (Patient not taking: Reported on 8/4/2023), Disp: 12 tablet, Rfl: 0    There is no problem list on file for this patient. Objective:  Ht 5' 0.5" (1.537 m)   Wt 113 kg (249 lb)   BMI 47.83 kg/m²     Right Shoulder Exam     Range of Motion   External rotation:  normal   Internal rotation 0 degrees:  abnormal     Tests   Drop arm: negative    Comments:  Mild decreased abduction b/l      Left Shoulder Exam     Range of Motion   External rotation:  normal   Internal rotation 0 degrees:  abnormal     Tests   Drop arm: negative             Physical Exam      Neurologic Exam    Procedures    I have personally reviewed pertinent films in PACS and my interpretation is Xrays Cervical Spine: Minimal reversal of curvature, minimal degenerative changes of disc, vertebral disc heights preserved, no acute findings such as fracture or osseous lesions              History reviewed. No pertinent past medical history.     Past Surgical History:   Procedure Laterality Date     SECTION         Social History     Socioeconomic History    Marital status: Single     Spouse name: Not on file    Number of children: Not on file    Years of education: Not on file    Highest education level: Not on file   Occupational History    Not on file   Tobacco Use    Smoking status: Never    Smokeless tobacco: Never   Vaping Use    Vaping Use: Never used   Substance and Sexual Activity    Alcohol use: Never    Drug use: Never    Sexual activity: Not on file   Other Topics Concern    Not on file   Social History Narrative    Not on file     Social Determinants of Health     Financial Resource Strain: Not on file   Food Insecurity: Not on file   Transportation Needs: Not on file   Physical Activity: Not on file   Stress: Not on file   Social Connections: Not on file   Intimate Partner Violence: Not on file   Housing Stability: Not on file       Family History   Problem Relation Age of Onset    Heart disease Mother     Hypertension Mother     Hyperlipidemia Mother     Diabetes Mother     Diabetes Father Heart disease Father     Hypertension Father     Alzheimer's disease Father     Rheum arthritis Father     Lupus Sister     Cancer Sister     Diabetes Brother

## 2023-10-26 ENCOUNTER — TELEPHONE (OUTPATIENT)
Age: 53
End: 2023-10-26

## 2023-10-26 NOTE — TELEPHONE ENCOUNTER
Caller: patient    Doctor: Jeremiah Mckeon    Reason for call: Patient went to  medication in pharmacy and the only order that was put in was ibuprofen, patient needs anti inflammatory medication sent over to pharmacy as well.      Pharmacy: [facility]/pharmacy #[id number], [address] [facility]   [address] [facility], [address] [place] [id number]     Call back#: [phone]

## 2023-10-26 NOTE — TELEPHONE ENCOUNTER
I called & left patient a detailed message in [facility] [place]. I made her aware that Ibuprofen is an NSAID (Non-steroidal Anti-Infammatory Drug)   And that if she was referring to something else, this would be the anti-inflammatory medication in question that was Rx by Dr. Naila Beckwith yesterday.

## 2023-12-06 ENCOUNTER — OFFICE VISIT (OUTPATIENT)
Dept: OBGYN CLINIC | Facility: MEDICAL CENTER | Age: 53
End: 2023-12-06
Payer: COMMERCIAL

## 2023-12-06 VITALS
HEIGHT: 61 IN | BODY MASS INDEX: 47.01 KG/M2 | DIASTOLIC BLOOD PRESSURE: 82 MMHG | WEIGHT: 249 LBS | SYSTOLIC BLOOD PRESSURE: 136 MMHG | HEART RATE: 90 BPM

## 2023-12-06 DIAGNOSIS — M25.512 CHRONIC PAIN OF BOTH SHOULDERS: Primary | ICD-10-CM

## 2023-12-06 DIAGNOSIS — G89.29 CHRONIC PAIN OF BOTH SHOULDERS: Primary | ICD-10-CM

## 2023-12-06 DIAGNOSIS — M54.2 NECK PAIN: ICD-10-CM

## 2023-12-06 DIAGNOSIS — M25.511 CHRONIC PAIN OF BOTH SHOULDERS: Primary | ICD-10-CM

## 2023-12-06 PROCEDURE — 99213 OFFICE O/P EST LOW 20 MIN: CPT | Performed by: EMERGENCY MEDICINE

## 2023-12-06 RX ORDER — METHYLPREDNISOLONE 4 MG/1
TABLET ORAL
Qty: 1 EACH | Refills: 0 | Status: SHIPPED | OUTPATIENT
Start: 2023-12-06

## 2023-12-06 NOTE — PROGRESS NOTES
Assessment/Plan:    Diagnoses and all orders for this visit:    Chronic pain of both shoulders  -     methylPREDNISolone 4 MG tablet therapy pack; Use as directed on package    Neck pain  -     methylPREDNISolone 4 MG tablet therapy pack; Use as directed on package      Medrol Dosepak, hold IBU  Patient to start PT at her convenience, hasn't scheduled due to transportation issues     ([id number]) used for entire encounter      Return for after you've had 6 weeks of PT. .      Subjective:   Patient ID: Sadie Ruiz is a 48 y.o. female. Patient returns with mild improvement of symptoms with IBU, experiencing neck and b/l shoulder which radiates down b/l arms. Occasional N/T. 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 She has been seen before in [place] for these symptoms. Review of Systems    The following portions of the patient's chart were reviewed and updated as appropriate:    Allergy:  No Known Allergies    Medications:    Current Outpatient Medications:     Diapers & Supplies (Pampers UnderJams L/XL) MISC, Use in the morning, Disp: 100 each, Rfl: 5    ibuprofen (MOTRIN) 600 mg tablet, Take 1 tablet (600 mg total) by mouth every 6 (six) hours as needed for moderate pain, Disp: 30 tablet, Rfl: 1    methylPREDNISolone 4 MG tablet therapy pack, Use as directed on package, Disp: 1 each, Rfl: 0    ferrous sulfate 325 (65 Fe) mg tablet, Take 1 tablet (325 mg total) by mouth daily (Patient not taking: Reported on 8/4/2023), Disp: 30 tablet, Rfl: 0    Multiple Vitamins-Minerals (multivitamin with minerals) tablet, Take 1 tablet by mouth daily (Patient not taking: Reported on 10/16/2023), Disp: , Rfl:     ondansetron (ZOFRAN) 4 mg tablet, Take 1 tablet (4 mg total) by mouth every 6 (six) hours (Patient not taking: Reported on 8/4/2023), Disp: 12 tablet, Rfl: 0    There is no problem list on file for this patient. Objective:  /82   Pulse 90   Ht 5' 0.5" (1.537 m)   Wt 113 kg (249 lb)   BMI 47.83 kg/m²     Right Shoulder Exam     Range of Motion   Active abduction:  abnormal       Left Shoulder Exam     Range of Motion   Active abduction:  abnormal              Physical Exam      Neurologic Exam    Procedures    I have personally reviewed the written report of the pertinent studies. Xray C spine            History reviewed. No pertinent past medical history.     Past Surgical History:   Procedure Laterality Date     SECTION         Social History     Socioeconomic History    Marital status: Single     Spouse name: Not on file    Number of children: Not on file    Years of education: Not on file    Highest education level: Not on file   Occupational History    Not on file   Tobacco Use    Smoking status: Never    Smokeless tobacco: Never   Vaping Use    Vaping Use: Never used   Substance and Sexual Activity    Alcohol use: Never    Drug use: Never    Sexual activity: Not on file   Other Topics Concern    Not on file   Social History Narrative    Not on file     Social Determinants of Health     Financial Resource Strain: Not on file   Food Insecurity: Not on file   Transportation Needs: Not on file   Physical Activity: Not on file   Stress: Not on file   Social Connections: Not on file   Intimate Partner Violence: Not on file   Housing Stability: Not on file       Family History   Problem Relation Age of Onset    Heart disease Mother     Hypertension Mother     Hyperlipidemia Mother     Diabetes Mother     Diabetes Father     Heart disease Father     Hypertension Father     Alzheimer's disease Father     Rheum arthritis Father     Lupus Sister     Cancer Sister     Diabetes Brother

## 2024-02-01 ENCOUNTER — HOSPITAL ENCOUNTER (OUTPATIENT)
Dept: MAMMOGRAPHY | Facility: CLINIC | Age: 54
End: 2024-02-01
Payer: COMMERCIAL

## 2024-02-01 VITALS — BODY MASS INDEX: 48.88 KG/M2 | WEIGHT: 249 LBS | HEIGHT: 60 IN

## 2024-02-01 DIAGNOSIS — N64.4 BREAST PAIN: ICD-10-CM

## 2024-02-01 DIAGNOSIS — N64.4 PAINFUL BREASTS: ICD-10-CM

## 2024-02-01 PROCEDURE — 76642 ULTRASOUND BREAST LIMITED: CPT

## 2024-02-01 PROCEDURE — G0279 TOMOSYNTHESIS, MAMMO: HCPCS

## 2024-02-01 PROCEDURE — 77066 DX MAMMO INCL CAD BI: CPT

## 2024-03-11 ENCOUNTER — TELEPHONE (OUTPATIENT)
Dept: FAMILY MEDICINE CLINIC | Facility: CLINIC | Age: 54
End: 2024-03-11

## 2024-03-11 DIAGNOSIS — Z12.11 SCREENING FOR COLON CANCER: Primary | ICD-10-CM

## 2024-03-11 NOTE — TELEPHONE ENCOUNTER
Patient is due for Colonoscopy. Previous order for Cologard placed on 8/4/23.    Contact patient. Patient state never received the test. New order placed today 03/11/24 and patient has been informed.

## 2024-03-15 ENCOUNTER — OFFICE VISIT (OUTPATIENT)
Dept: FAMILY MEDICINE CLINIC | Facility: CLINIC | Age: 54
End: 2024-03-15
Payer: COMMERCIAL

## 2024-03-15 VITALS
BODY MASS INDEX: 50.65 KG/M2 | SYSTOLIC BLOOD PRESSURE: 134 MMHG | WEIGHT: 258 LBS | HEIGHT: 60 IN | OXYGEN SATURATION: 96 % | DIASTOLIC BLOOD PRESSURE: 82 MMHG | HEART RATE: 110 BPM | TEMPERATURE: 97.3 F

## 2024-03-15 DIAGNOSIS — R00.0 TACHYCARDIA: ICD-10-CM

## 2024-03-15 DIAGNOSIS — Z00.00 ROUTINE MEDICAL EXAM: Primary | ICD-10-CM

## 2024-03-15 DIAGNOSIS — N63.22 MASS OF UPPER INNER QUADRANT OF LEFT BREAST: ICD-10-CM

## 2024-03-15 DIAGNOSIS — E66.01 CLASS 3 SEVERE OBESITY DUE TO EXCESS CALORIES WITHOUT SERIOUS COMORBIDITY WITH BODY MASS INDEX (BMI) OF 50.0 TO 59.9 IN ADULT (HCC): ICD-10-CM

## 2024-03-15 DIAGNOSIS — E11.65 TYPE 2 DIABETES MELLITUS WITH HYPERGLYCEMIA, WITHOUT LONG-TERM CURRENT USE OF INSULIN (HCC): ICD-10-CM

## 2024-03-15 DIAGNOSIS — E78.00 HYPERCHOLESTEREMIA: ICD-10-CM

## 2024-03-15 DIAGNOSIS — R03.0 WHITE COAT SYNDROME WITHOUT HYPERTENSION: ICD-10-CM

## 2024-03-15 DIAGNOSIS — H53.9 VISUAL DISTURBANCES: ICD-10-CM

## 2024-03-15 PROBLEM — E66.813 CLASS 3 SEVERE OBESITY WITH BODY MASS INDEX (BMI) OF 50.0 TO 59.9 IN ADULT (HCC): Status: ACTIVE | Noted: 2024-03-15

## 2024-03-15 PROCEDURE — 99215 OFFICE O/P EST HI 40 MIN: CPT | Performed by: FAMILY MEDICINE

## 2024-03-15 RX ORDER — METFORMIN HYDROCHLORIDE 500 MG/1
500 TABLET, EXTENDED RELEASE ORAL
Qty: 90 TABLET | Refills: 1 | Status: SHIPPED | OUTPATIENT
Start: 2024-03-15

## 2024-03-15 NOTE — PROGRESS NOTES
Name: Margie Neil      : 1970      MRN: [id number]  Encounter Provider: Giuseppe Ford DO  Encounter Date: 3/15/2024   Encounter department: [facility]    Chief Complaint   Patient presents with    Follow-up    Mass     On breast        Assessment & Plan       Discussed foods to cut back on - namely breads and rice.        Check pulse out the office weekly and record.            1. Routine medical exam  -     Ambulatory Referral to Obstetrics / Gynecology; Future    2. Visual disturbances  -     Ambulatory Referral to Ophthalmology; Future    3. Type 2 diabetes mellitus with hyperglycemia, without long-term current use of insulin ([facility])  -     metFORMIN (GLUCOPHAGE-XR) 500 mg 24 hr tablet; Take 1 tablet (500 mg total) by mouth daily with breakfast  -     Hemoglobin A1C; Future; Expected date: 2024    4. Class 3 severe obesity due to excess calories without serious comorbidity with body mass index (BMI) of 50.0 to 59.9 in adult ([facility])  -     Ambulatory Referral to Weight Management; Future    5. Mass of upper inner quadrant of left breast  -     Ambulatory Referral to General Surgery; Future    6. Tachycardia    7. White coat syndrome without hypertension    8. Hypercholesteremia           Subjective     Doing better.  Request weight management.  Review labs, A1c: 6.8 %.  Like bread and rice.  Left breast superficial lump itches- looks like a black comedone.  Vision off with reading.  Jetania in room during exam.      Review of Systems   Constitutional: Negative.    HENT: Negative.     Eyes: Negative.    Respiratory: Negative.     Cardiovascular: Negative.    Gastrointestinal: Negative.    Genitourinary: Negative.    Musculoskeletal: Negative.    Skin: Negative.    Neurological: Negative.    Psychiatric/Behavioral: Negative.         History reviewed. No pertinent past medical history.  Past Surgical History:   Procedure Laterality Date     SECTION       Family History    Problem Relation Age of Onset    Heart disease Mother     Hypertension Mother     Hyperlipidemia Mother     Diabetes Mother     Diabetes Father     Heart disease Father     Hypertension Father     Alzheimer's disease Father     Rheum arthritis Father     Lupus Sister     No Known Problems Sister     No Known Problems Sister     No Known Problems Sister     No Known Problems Sister     No Known Problems Daughter     No Known Problems Daughter     No Known Problems Maternal Grandmother     No Known Problems Maternal Grandfather     No Known Problems Paternal Grandmother     No Known Problems Paternal Grandfather     Diabetes Brother     Breast cancer Maternal Aunt         68    No Known Problems Paternal Aunt     No Known Problems Paternal Aunt     No Known Problems Paternal Aunt     No Known Problems Paternal Aunt      Social History     Socioeconomic History    Marital status: Single     Spouse name: None    Number of children: None    Years of education: None    Highest education level: None   Occupational History    None   Tobacco Use    Smoking status: Never    Smokeless tobacco: Never   Vaping Use    Vaping status: Never Used   Substance and Sexual Activity    Alcohol use: Never    Drug use: Never    Sexual activity: None   Other Topics Concern    None   Social History Narrative    None     Social Determinants of Health     Financial Resource Strain: Not on file   Food Insecurity: Not on file   Transportation Needs: Not on file   Physical Activity: Not on file   Stress: Not on file   Social Connections: Not on file   Intimate Partner Violence: Not on file   Housing Stability: Not on file     Current Outpatient Medications on File Prior to Visit   Medication Sig    Diapers & Supplies (Pampers Florencia L/XL) MISC Use in the morning    ferrous sulfate 325 (65 Fe) mg tablet Take 1 tablet (325 mg total) by mouth daily    [DISCONTINUED] ibuprofen (MOTRIN) 600 mg tablet Take 1 tablet (600 mg total) by mouth every 6  (six) hours as needed for moderate pain (Patient not taking: Reported on 3/15/2024)    [DISCONTINUED] methylPREDNISolone 4 MG tablet therapy pack Use as directed on package (Patient not taking: Reported on 3/15/2024)    [DISCONTINUED] Multiple Vitamins-Minerals (multivitamin with minerals) tablet Take 1 tablet by mouth daily (Patient not taking: Reported on 10/16/2023)    [DISCONTINUED] ondansetron (ZOFRAN) 4 mg tablet Take 1 tablet (4 mg total) by mouth every 6 (six) hours (Patient not taking: Reported on 8/4/2023)     No Known Allergies    There is no immunization history on file for this patient.    Objective     /82 (BP Location: Left arm, Patient Position: Sitting, Cuff Size: Large)   Pulse (!) 110   Temp (!) 97.3 °F (36.3 °C) (Temporal)   Ht 5' (1.524 m)   Wt 117 kg (258 lb)   SpO2 96%   BMI 50.39 kg/m²     Physical Exam  Constitutional:       Appearance: She is well-developed.   HENT:      Head: Normocephalic and atraumatic.      Right Ear: External ear normal.      Left Ear: External ear normal.      Nose: Nose normal.      Mouth/Throat:      Mouth: Mucous membranes are moist.      Pharynx: Oropharynx is clear.      Comments: No teeth.  Eyes:      Conjunctiva/sclera: Conjunctivae normal.      Pupils: Pupils are equal, round, and reactive to light.   Cardiovascular:      Rate and Rhythm: Regular rhythm. Tachycardia present.      Heart sounds: Normal heart sounds.   Pulmonary:      Effort: Pulmonary effort is normal.      Breath sounds: Normal breath sounds.   Abdominal:      General: Bowel sounds are normal.      Palpations: Abdomen is soft.   Musculoskeletal:      Cervical back: Normal range of motion and neck supple.   Skin:     General: Skin is warm and dry.      Comments: Superficial cyst, 10 o'clock left breast.   Neurological:      Mental Status: She is alert and oriented to person, place, and time.      Deep Tendon Reflexes: Reflexes are normal and symmetric.   Psychiatric:         Mood and  Affect: Mood normal.         Behavior: Behavior normal.         Thought Content: Thought content normal.         Judgment: Judgment normal.       Giuseppe Ford, DO

## 2024-03-18 ENCOUNTER — TELEPHONE (OUTPATIENT)
Dept: OBGYN CLINIC | Facility: CLINIC | Age: 54
End: 2024-03-18

## 2024-03-18 NOTE — TELEPHONE ENCOUNTER
Left VM making patient aware that we received a referral for her to schedule her Annual exam. Please call back and schedule appt. [phone].

## 2024-04-03 ENCOUNTER — VBI (OUTPATIENT)
Dept: ADMINISTRATIVE | Facility: OTHER | Age: 54
End: 2024-04-03

## 2024-07-17 ENCOUNTER — EVALUATION (OUTPATIENT)
Dept: PHYSICAL THERAPY | Facility: CLINIC | Age: 54
End: 2024-07-17
Payer: COMMERCIAL

## 2024-07-17 DIAGNOSIS — G89.29 CHRONIC PAIN OF BOTH SHOULDERS: Primary | ICD-10-CM

## 2024-07-17 DIAGNOSIS — M25.511 CHRONIC PAIN OF BOTH SHOULDERS: Primary | ICD-10-CM

## 2024-07-17 DIAGNOSIS — M25.512 CHRONIC PAIN OF BOTH SHOULDERS: Primary | ICD-10-CM

## 2024-07-17 DIAGNOSIS — M54.2 NECK PAIN: ICD-10-CM

## 2024-07-17 PROCEDURE — 97162 PT EVAL MOD COMPLEX 30 MIN: CPT

## 2024-07-17 PROCEDURE — 97112 NEUROMUSCULAR REEDUCATION: CPT

## 2024-07-17 PROCEDURE — 97110 THERAPEUTIC EXERCISES: CPT

## 2024-07-17 NOTE — PROGRESS NOTES
PT Evaluation     Today's date: 2024  Patient name: Margie Neil  : 1970  MRN: [id number]  Referring provider: Tee Field MD  Dx:   Encounter Diagnosis     ICD-10-CM    1. Chronic pain of both shoulders  M25.511 Ambulatory Referral to Physical Therapy    G89.29     M25.512       2. Neck pain  M54.2 Ambulatory Referral to Physical Therapy          Start Time: 1540  Stop Time: 1630  Total time in clinic (min): 50 minutes    Assessment  Impairments: abnormal muscle firing, abnormal or restricted ROM, activity intolerance, impaired physical strength, lacks appropriate home exercise program, pain with function and poor body mechanics  Symptom irritability: moderate    Assessment details: Pt is a 54 y.o. year old female presenting to physical therapy for chronic pain of both shoulders and neck pain. She presents with the following impairments: hypomobility of cervical and upper thoracic spine, decreased cervical ROM in all movements, increased tension in all cervical, upper thoracic, UT, LS, and SCM musculature bilaterally, decreased strength and endurance in all scapular stabilization and postural musculature, increased tension in median and ulnar nerves b/l, poor scapular mobility b/l, and poor posture with forward head and rounded shoulders affecting her function with cervical movements, lifting over 10#, and carrying.  Pt will benefit from skilled physical therapy to address functional limitations noted in evaluation and meet patient goals.   Understanding of Dx/Px/POC: good     Prognosis: good    Goals  ST. Pt will be independent with HEP.  2. Pt will improve cervical mobility deficits by 50%   3. Pt will improve FOTO score from baseline set during initial evaluation  LT. Pt will be able to lift 15# weight over head for tasks needed in ADLs  2. Pt will improve b/l scapular stabilization and postural musculature strength grossly by 2 grades or more  3. Pt will be able to lift 25#  without increase in symptoms in shoulder  4. Pt will reach FOTO goal set by sammi during initial evaluation      Plan  Patient would benefit from: PT eval and skilled physical therapy  Planned modality interventions: biofeedback, manual electrical stimulation, microcurrent electrical stimulation, TENS, electrical stimulation/Russian stimulation, thermotherapy: hydrocollator packs, cryotherapy and unattended electrical stimulation    Planned therapy interventions: abdominal trunk stabilization, joint mobilization, manual therapy, massage, ADL retraining, neuromuscular re-education, body mechanics training, patient education, postural training, strengthening, stretching, therapeutic activities, therapeutic exercise, flexibility, functional ROM exercises and home exercise program    Frequency: 2x week  Duration in weeks: 6  Treatment plan discussed with: patient      Subjective Evaluation    History of Present Illness  Mechanism of injury: Pt is a 54 y.o. female presenting with chronic neck pain and b/l shoulder pain. Pt reports that this pain has been present for over a year now, with no YENI. Pt received X-rays of her c-spine on 10/25/2023 which showed no acute nor chronic changes to her neck. Pt noted their pain is usually located on the top parts of her shoulder (UT region) and into her neck on both sides. Pt also reports that when her pain is very strong she gets increased headaches..Pt reports that any cervical movements, lifting over 10#, and carrying is what causes them the most pain and are the most difficult movements for them to perfrom. Pt reports that medications and ointments helps relieve the pain. Pt stated that their main goals for therapy are pain reduction and improved function with any cervical movements, lifting over 10#, and carrying.   Quality of life: good    Patient Goals  Patient goals for therapy: decreased edema, decreased pain, improved balance, increased motion, increased strength and  independence with ADLs/IADLs    Pain  Current pain ratin  At best pain ratin  At worst pain ratin  Quality: dull ache  Relieving factors: medications  Aggravating factors: overhead activity and lifting          Objective     Active Range of Motion   Cervical/Thoracic Spine       Cervical    Flexion:  Restriction level: minimal  Extension:  Restriction level: moderate  Left lateral flexion:  with pain Restriction level: maximal  Right lateral flexion:  with pain Restriction level maximal  Left rotation:  with pain Restriction level: maximal  Right rotation:  with pain Restriction level: maximal  Left Shoulder   Flexion: 140 degrees   Abduction: 105 degrees     Right Shoulder   Flexion: 140 degrees   Abduction: 105 degrees     Passive Range of Motion   Left Shoulder   Flexion: 150 degrees   Abduction: 110 degrees     Right Shoulder   Flexion: 150 degrees   Abduction: 110 degrees     Scapular Mobility   Left Shoulder   Scapular mobility: poor    Right Shoulder   Scapular mobility: poor    Additional Scapular Mobility Details  Hypomobility of both shoulder    Joint Play     Hypomobile: C3, C4, C5, C6, C7, T1, T2, T3, T4 and T5     Pain: C3, C4, C5, C6, C7, T1, T2, T3, T4 and T5     Strength/Myotome Testing   Cervical Spine     Left   Levator scapulae (C4): 3+    Right   Levator scapulae (C4): 3+    Left Shoulder     Planes of Motion   Flexion: 5   Extension: 5   Abduction: 5   External rotation at 0°: 5   Internal rotation at 0°: 5     Isolated Muscles   Levator scapulae: 3+   Lower trapezius: 3+   Middle trapezius: 3+   Serratus anterior: 3+   Upper trapezius: 3+     Right Shoulder     Planes of Motion   Flexion: 5   Extension: 5   Abduction: 5   External rotation at 0°: 5   Internal rotation at 0°: 5     Isolated Muscles   Levator scapulae: 3+   Lower trapezius: 3+   Middle trapezius: 3+   Serratus anterior: 3+   Upper trapezius: 3+     Left Elbow   Flexion: 5  Extension: 5    Right Elbow   Flexion:  "5  Extension: 5    Tests   Cervical   Positive vertical compression and cervical distraction test.    Left   Positive Spurling's Test A.     Right   Positive Spurling's Test A.     Left Shoulder   Positive ULTT1 and ULTT4.   Negative active compression ([place]), crossover, drop arm, empty can, external rotation lag sign, full can, Hawkin's, Neer's, painful arc and ULTT3.     Right Shoulder   Positive ULTT1 and ULTT4.   Negative active compression ([place]), crossover, drop arm, empty can, external rotation lag sign, full can, Hawkin's, Neer's, painful arc and ULTT3.     Lumbar   Positive vertical compression .     General Comments:      Cervical/Thoracic Comments  Postrue: Forward head and rounded shoulders  Increased tension in all cervical and upper throacic paraspinal muscualture  Increased tension in all Ut, Ls, and SCM b/l      Flowsheet Rows      Flowsheet Row Most Recent Value   PT/OT G-Codes    Current Score 40   Projected Score 58               Precautions: [place] African Speaking,DM      Date 7/17            Visit # IE            FOTO IE             Re-eval IE               Manuals 7/17            Cervical STM             UT/LS Str             SOR                          Neuro Re-Ed 7/17            Cervical Snags NV            Chin Tuck 10x3\"             Shoulder Shrugs             DNF             UT Str 2x30\" ea            LS Sr 2x30\" ea            Finger Ladder                           Ther Ex 7/17            UBE             Openbooks 10x3\" ea            Scap Retractions 10x3\"            No Monies             Rows/Ext             Scap Punches             Resisted IR/ER             Wall Clocks             Wall Walks             Shoulder AAROM             Ther Activity 7/17            Cones                          Gait Training 7/17                                      Modalities 7/17                                                 "

## 2024-07-24 ENCOUNTER — OFFICE VISIT (OUTPATIENT)
Dept: PHYSICAL THERAPY | Facility: CLINIC | Age: 54
End: 2024-07-24
Payer: COMMERCIAL

## 2024-07-24 DIAGNOSIS — G89.29 CHRONIC PAIN OF BOTH SHOULDERS: Primary | ICD-10-CM

## 2024-07-24 DIAGNOSIS — M25.511 CHRONIC PAIN OF BOTH SHOULDERS: Primary | ICD-10-CM

## 2024-07-24 DIAGNOSIS — M25.512 CHRONIC PAIN OF BOTH SHOULDERS: Primary | ICD-10-CM

## 2024-07-24 PROCEDURE — 97110 THERAPEUTIC EXERCISES: CPT

## 2024-07-24 PROCEDURE — 97140 MANUAL THERAPY 1/> REGIONS: CPT

## 2024-07-24 PROCEDURE — 97112 NEUROMUSCULAR REEDUCATION: CPT

## 2024-07-24 NOTE — PROGRESS NOTES
"Daily Note     Today's date: 2024  Patient name: Margie Neil  : 1970  MRN: [id number]  Referring provider: Tee Field MD  Dx:   Encounter Diagnosis     ICD-10-CM    1. Chronic pain of both shoulders  M25.511     G89.29     M25.512                      Subjective: Pt presents to PT reporting she feels a little better grading pain a 5/10 in CS and B UTs.  Reports compliance with HEP.  Pt reports positive response to massage reporting decreased tightness,  Translation sammi used for translation.      Objective: See treatment diary below      Assessment: Tolerated treatment well. Pt demonstrates good response to progressions with no complaints however she did have difficulty with UBE secondary to pain in L CS/UT.  Pt demonstrates appropriate challenge to TE performed. Pt did require some cuing for correct form, reps and holds of TE performed.  Noted muscle restrictions while performing manual therapy in B UT, CS and TS paraspinals.  Patient demonstrated fatigue post treatment, exhibited good technique with therapeutic exercises, and would benefit from continued PT to increase flexibility, strength and function.      Plan: Continue per plan of care.      Precautions: Romansh Speaking,DM      Date            Visit # IE 2           FOTO IE             Re-eval IE               Manuals            Cervical STM             UT/LS Str             SOR                          Neuro Re-Ed            Cervical Snags NV Rot 10x ea           Chin Tuck 10x3\"  3\" X 20           Shoulder Shrugs             DNF             UT Str 2x30\" ea 2x30\" ea           LS Sr 2x30\" ea 2x30\" ea           Finger Ladder   10\" x 10 B                        Ther Ex            UBE  2'/2'           Openbooks 10x3\" ea            Scap Retractions 10x3\" 15 x 3\"           No Monies  OTB 3\" x 15           Rows/Ext  8#/6# 15x 3\" ea           Scap Punches             Resisted IR/ER             Wall Clocks    "          Wall Walks             Shoulder AAROM             Ther Activity 7/17            Cones                          Gait Training 7/17                                      Modalities 7/17

## 2024-07-31 ENCOUNTER — OFFICE VISIT (OUTPATIENT)
Dept: PHYSICAL THERAPY | Facility: CLINIC | Age: 54
End: 2024-07-31
Payer: COMMERCIAL

## 2024-07-31 DIAGNOSIS — M25.511 CHRONIC PAIN OF BOTH SHOULDERS: Primary | ICD-10-CM

## 2024-07-31 DIAGNOSIS — M25.512 CHRONIC PAIN OF BOTH SHOULDERS: Primary | ICD-10-CM

## 2024-07-31 DIAGNOSIS — M54.2 NECK PAIN: ICD-10-CM

## 2024-07-31 DIAGNOSIS — G89.29 CHRONIC PAIN OF BOTH SHOULDERS: Primary | ICD-10-CM

## 2024-07-31 PROCEDURE — 97112 NEUROMUSCULAR REEDUCATION: CPT

## 2024-07-31 PROCEDURE — 97110 THERAPEUTIC EXERCISES: CPT

## 2024-07-31 NOTE — PROGRESS NOTES
"Daily Note     Today's date: 2024  Patient name: Margie Neil  : 1970  MRN: [id number]  Referring provider: Tee Field MD  Dx:   Encounter Diagnosis     ICD-10-CM    1. Chronic pain of both shoulders  M25.511     G89.29     M25.512       2. Neck pain  M54.2           Start Time: 1655  Stop Time: 1735  Total time in clinic (min): 40 minutes    Subjective: Pt reports that her shoulders are feeling better coming into today's session with less intense pain reported.       Objective: See treatment diary below      Assessment: Pt tolerated treatment well. Added resisted IR/ER, wall clocks, and wall walks to improve the strength and endurance of all shoulder, rotator cuff, scapular stabilization, and postural musculature. Progressed Benjy rows and extensions from 8# and 6# to 10#. Pt was challenged by new and progressed exercises, needed both moderate cueing for form corrections, and rest breaks due to increased fatigue. Once pt was cued and rest breaks were taken, pt was able to complete all remaining sets and reps with proper form and appropriate levels of fatigue post PT session. Patient exhibited good technique with therapeutic exercises and would benefit from continued PT      Plan: Continue per plan of care.  Progress treatment as tolerated.       Precautions: Tristanian Speaking,DM      Date           Visit # IE 2 3          FOTO IE             Re-eval IE               Manuals           Cervical STM             UT/LS Str             SOR                          Neuro Re-Ed           Cervical Snags NV Rot 10x ea 10x 3\" ea          Chin Tuck 10x3\"  3\" X 20 20x3\" ea          Shoulder Shrugs             DNF             UT Str 2x30\" ea 2x30\" ea 2x30\" ea          LS Sr 2x30\" ea 2x30\" ea 2x30\" ea          Finger Ladder   10\" x 10 B                        Ther Ex           UBE  2'/2' 3'/3'          Openbooks 10x3\" ea  10x3\" ea          Scap " "Retractions 10x3\" 15 x 3\"           No Monies  OTB 3\" x 15           Rows/Ext  8#/6# 15x 3\" ea 10# 3x10          Scap Punches             Resisted IR/ER   20x ea 5#          Wall Clocks   10 laps GTB          Wall Walks   10 laps GTB          Shoulder AAROM             Ther Activity 7/17            Cones                          Gait Training 7/17                                      Modalities 7/17                                                   "

## 2024-08-07 ENCOUNTER — OFFICE VISIT (OUTPATIENT)
Dept: PHYSICAL THERAPY | Facility: CLINIC | Age: 54
End: 2024-08-07
Payer: COMMERCIAL

## 2024-08-07 DIAGNOSIS — G89.29 CHRONIC PAIN OF BOTH SHOULDERS: Primary | ICD-10-CM

## 2024-08-07 DIAGNOSIS — M25.512 CHRONIC PAIN OF BOTH SHOULDERS: Primary | ICD-10-CM

## 2024-08-07 DIAGNOSIS — M54.2 NECK PAIN: ICD-10-CM

## 2024-08-07 DIAGNOSIS — M25.511 CHRONIC PAIN OF BOTH SHOULDERS: Primary | ICD-10-CM

## 2024-08-07 PROCEDURE — 97110 THERAPEUTIC EXERCISES: CPT

## 2024-08-07 NOTE — PROGRESS NOTES
"Daily Note     Today's date: 2024  Patient name: Margie Neil  : 1970  MRN: [id number]  Referring provider: Tee Field MD  Dx:   Encounter Diagnosis     ICD-10-CM    1. Chronic pain of both shoulders  M25.511     G89.29     M25.512       2. Neck pain  M54.2           Start Time: 1600  Stop Time: 1631  Total time in clinic (min): 31 minutes    Subjective: Pt reports pain has been mild, rated no greater than a 3/10      Objective: See treatment diary below      Assessment: Margie tolerated treatment well with consistent cuing throughout. TE's were performed with the same resistance and reps. No new TE's were performed today. Following treatment, the patient demonstrated fatigue post treatment, exhibited good technique with therapeutic exercises, and would benefit from continued PT.         Plan: Continue per plan of care.      Precautions: [place]an Speaking,DM      Date  8         Visit # IE 2 3 4         FOTO IE             Re-eval IE               Manuals  8         Cervical STM             UT/LS Str             SOR                          Neuro Re-Ed  8         Cervical Snags NV Rot 10x ea 10x 3\" ea 10x 3\" ea         Chin Tuck 10x3\"  3\" X 20 20x3\" ea 20x3'' ea          Shoulder Shrugs             DNF             UT Str 2x30\" ea 2x30\" ea 2x30\" ea 2x30\" ea         LS Sr 2x30\" ea 2x30\" ea 2x30\" ea 2x30\" ea         Finger Ladder   10\" x 10 B                        Ther Ex  8         UBE  2'/2' 3'/3' 3/3'         Openbooks 10x3\" ea  10x3\" ea 10x3\" ea         Scap Retractions 10x3\" 15 x 3\"           No Monies  OTB 3\" x 15           Rows/Ext  8#/6# 15x 3\" ea 10# 3x10 10# 3x10         Scap Punches             Resisted IR/ER   20x ea 5# 20x ea 5#         Wall Clocks   10 laps GTB 10 laps GTB         Wall Walks   10 laps GTB 10 laps GTB         Shoulder AAROM             Ther Activity             Cones                          Gait " Training 7/17                                      Modalities 7/17

## 2024-08-14 ENCOUNTER — APPOINTMENT (OUTPATIENT)
Dept: PHYSICAL THERAPY | Facility: CLINIC | Age: 54
End: 2024-08-14
Payer: COMMERCIAL

## 2024-08-18 LAB — COLOGUARD RESULT REPORTABLE: NEGATIVE

## 2024-08-28 ENCOUNTER — APPOINTMENT (OUTPATIENT)
Dept: PHYSICAL THERAPY | Facility: CLINIC | Age: 54
End: 2024-08-28
Payer: COMMERCIAL

## 2024-09-04 ENCOUNTER — OFFICE VISIT (OUTPATIENT)
Dept: PHYSICAL THERAPY | Facility: CLINIC | Age: 54
End: 2024-09-04
Payer: COMMERCIAL

## 2024-09-04 DIAGNOSIS — M54.2 NECK PAIN: ICD-10-CM

## 2024-09-04 DIAGNOSIS — M25.512 CHRONIC PAIN OF BOTH SHOULDERS: Primary | ICD-10-CM

## 2024-09-04 DIAGNOSIS — G89.29 CHRONIC PAIN OF BOTH SHOULDERS: Primary | ICD-10-CM

## 2024-09-04 DIAGNOSIS — M25.511 CHRONIC PAIN OF BOTH SHOULDERS: Primary | ICD-10-CM

## 2024-09-04 PROCEDURE — 97140 MANUAL THERAPY 1/> REGIONS: CPT

## 2024-09-04 PROCEDURE — 97110 THERAPEUTIC EXERCISES: CPT

## 2024-09-04 NOTE — PROGRESS NOTES
"Daily Note     Today's date: 2024  Patient name: Margie Neil  : 1970  MRN: [id number]  Referring provider: Tee Field MD  Dx:   Encounter Diagnosis     ICD-10-CM    1. Chronic pain of both shoulders  M25.511     G89.29     M25.512       2. Neck pain  M54.2           Start Time: 1635  Stop Time: 1715  Total time in clinic (min): 40 minutes    Subjective: Pt reports that her shoulder have had increased discomfort in her upper trap region over the last 2 weeks.       Objective: See treatment diary below      Assessment: Pt tolerated treatment well. Pt responded well to manual STM, stretching, and mobilizations to cervical spine and associated musculature, with reduction of symptom intensity post manual interventions. Continued to progress both resistance and duration of exercises during today PT session in order to improve the strength, endurance,and tolerance to activities globally throughout the shoulder, rotator cuff, scapular stabilization, and postural musculature. Pt was challenged with several of the progressions made during the session, but was able to maintain proper form and have no exacerbation of symptoms throughout all sets and reps. Continue to progress pt in order to move forward towards reaching previously set goals. Patient exhibited good technique with therapeutic exercises and would benefit from continued PT      Plan: Continue per plan of care.  Progress treatment as tolerated.       Precautions: Arabic Speaking,DM      Date  9/4        Visit # IE 2 3 4 5        FOTO IE     DC        Re-eval IE               Manuals  8 9/4        Cervical STM     PWK        UT/LS Str     PWK        SOR     PWK                     Neuro Re-Ed  87 9/4        Cervical Snags NV Rot 10x ea 10x 3\" ea 10x 3\" ea         Chin Tuck 10x3\"  3\" X 20 20x3\" ea 20x3'' ea          Shoulder Shrugs             DNF             UT Str 2x30\" ea 2x30\" ea 2x30\" ea " "2x30\" ea         LS Sr 2x30\" ea 2x30\" ea 2x30\" ea 2x30\" ea         Finger Ladder   10\" x 10 B                        Ther Ex 7/17 7/24 7/31 8/7 9/4        UBE  2'/2' 3'/3' 3/3' 3'/3'        Openbooks 10x3\" ea  10x3\" ea 10x3\" ea 15x3\" ea        Scap Retractions 10x3\" 15 x 3\"   15x3\" 3#        No Monies  OTB 3\" x 15   3x15 PTB        Rows/Ext  8#/6# 15x 3\" ea 10# 3x10 10# 3x10 30x BTB        Scap Punches             Resisted IR/ER   20x ea 5# 20x ea 5# 20x BTB        Wall Clocks   10 laps GTB 10 laps GTB 10 laps BGTB        Wall Walks   10 laps GTB 10 laps GTB 10 laps GTB        Shoulder AAROM             Ther Activity 7/17            Cones                          Gait Training 7/17                                      Modalities 7/17                                                       "

## 2024-09-11 ENCOUNTER — OFFICE VISIT (OUTPATIENT)
Dept: PHYSICAL THERAPY | Facility: CLINIC | Age: 54
End: 2024-09-11
Payer: COMMERCIAL

## 2024-09-11 DIAGNOSIS — M54.2 NECK PAIN: ICD-10-CM

## 2024-09-11 DIAGNOSIS — M25.512 CHRONIC PAIN OF BOTH SHOULDERS: Primary | ICD-10-CM

## 2024-09-11 DIAGNOSIS — G89.29 CHRONIC PAIN OF BOTH SHOULDERS: Primary | ICD-10-CM

## 2024-09-11 DIAGNOSIS — M25.511 CHRONIC PAIN OF BOTH SHOULDERS: Primary | ICD-10-CM

## 2024-09-11 PROCEDURE — 97010 HOT OR COLD PACKS THERAPY: CPT

## 2024-09-11 PROCEDURE — 97110 THERAPEUTIC EXERCISES: CPT

## 2024-09-11 NOTE — PROGRESS NOTES
"Daily Note     Today's date: 2024  Patient name: Margie Neil  : 1970  MRN: [id number]  Referring provider: Tee Field MD  Dx:   Encounter Diagnosis     ICD-10-CM    1. Chronic pain of both shoulders  M25.511     G89.29     M25.512       2. Neck pain  M54.2                      Subjective: Pt presents to the clinic with new cervical pain on the L side that is radiating down to her L hand and 5th digit. She states the pain is new and that it started yesterday. She requests to not do any cervical exercises this date.       Objective: See treatment diary below      Assessment: Pt presents to the clinic with newly acquired radicular symptoms down the entire LUE. Pt describes the pain as being an 8/10 and feeling like electricity. Pt could not tolerate any cervical mobs or distraction due to LUE pain. Pt could not tolerate any activity with the LUE as it was too painful. Positive cervical compression and L spurlings. Pt's evaluating therapist was notified. Pt ended session with cervical heat to help symptoms. Extensive education performed this date as to the pt's symptoms. Will monitor symptoms further. Tolerated treatment well. Patient demonstrated fatigue post treatment and would benefit from continued PT      Plan: Continue per plan of care.      Precautions: Angolan Speaking,DM      Date        Visit # IE 2 3 4 5 6       FOTO IE     DC        Re-eval IE               Manuals        Cervical STM     PWK NH       UT/LS Str     PWK NH       SOR     PWK NH                    Neuro Re-Ed        Cervical Snags NV Rot 10x ea 10x 3\" ea 10x 3\" ea         Chin Tuck 10x3\"  3\" X 20 20x3\" ea 20x3'' ea          Shoulder Shrugs             DNF             UT Str 2x30\" ea 2x30\" ea 2x30\" ea 2x30\" ea         LS Sr 2x30\" ea 2x30\" ea 2x30\" ea 2x30\" ea         Finger Ladder   10\" x 10 B                        Ther Ex  8 " "9/4 9/11       UBE  2'/2' 3'/3' 3/3' 3'/3' 3'/3'       Openbooks 10x3\" ea  10x3\" ea 10x3\" ea 15x3\" ea 15x3\" RSL p! (L radicular symp)       Scap Retractions 10x3\" 15 x 3\"   15x3\" 3#        No Monies  OTB 3\" x 15   3x15 PTB        Rows/Ext  8#/6# 15x 3\" ea 10# 3x10 10# 3x10 30x BTB        Scap Punches             Resisted IR/ER   20x ea 5# 20x ea 5# 20x BTB        Wall Clocks   10 laps GTB 10 laps GTB 10 laps BGTB        Wall Walks   10 laps GTB 10 laps GTB 10 laps GTB        Shoulder AAROM             Patient educ      NH       Ther Activity 7/17            Cones                          Gait Training 7/17                                      Modalities 7/17            MHP      10\"                                       "

## 2024-09-12 DIAGNOSIS — E11.65 TYPE 2 DIABETES MELLITUS WITH HYPERGLYCEMIA, WITHOUT LONG-TERM CURRENT USE OF INSULIN (HCC): ICD-10-CM

## 2024-09-12 RX ORDER — METFORMIN HCL 500 MG
500 TABLET, EXTENDED RELEASE 24 HR ORAL
Qty: 90 TABLET | Refills: 0 | Status: SHIPPED | OUTPATIENT
Start: 2024-09-12

## 2024-09-18 ENCOUNTER — APPOINTMENT (OUTPATIENT)
Dept: PHYSICAL THERAPY | Facility: CLINIC | Age: 54
End: 2024-09-18
Payer: COMMERCIAL

## 2024-10-02 ENCOUNTER — TELEPHONE (OUTPATIENT)
Age: 54
End: 2024-10-02

## 2024-10-02 NOTE — TELEPHONE ENCOUNTER
Patient called; warm transfer to [facility] #[id number]. Annual physical scheduled for 10/09/24.

## 2024-10-09 ENCOUNTER — OFFICE VISIT (OUTPATIENT)
Dept: FAMILY MEDICINE CLINIC | Facility: CLINIC | Age: 54
End: 2024-10-09
Payer: COMMERCIAL

## 2024-10-09 VITALS
HEIGHT: 60 IN | WEIGHT: 253.6 LBS | HEART RATE: 96 BPM | DIASTOLIC BLOOD PRESSURE: 76 MMHG | OXYGEN SATURATION: 98 % | TEMPERATURE: 96.5 F | SYSTOLIC BLOOD PRESSURE: 138 MMHG | BODY MASS INDEX: 49.79 KG/M2

## 2024-10-09 DIAGNOSIS — J45.20 MILD INTERMITTENT ASTHMA WITHOUT COMPLICATION: ICD-10-CM

## 2024-10-09 DIAGNOSIS — Z00.00 ANNUAL PHYSICAL EXAM: ICD-10-CM

## 2024-10-09 DIAGNOSIS — E11.65 TYPE 2 DIABETES MELLITUS WITH HYPERGLYCEMIA, WITHOUT LONG-TERM CURRENT USE OF INSULIN (HCC): Primary | ICD-10-CM

## 2024-10-09 DIAGNOSIS — D64.9 ANEMIA: ICD-10-CM

## 2024-10-09 DIAGNOSIS — N63.22 MASS OF UPPER INNER QUADRANT OF LEFT BREAST: ICD-10-CM

## 2024-10-09 DIAGNOSIS — Z12.4 SCREENING FOR CERVICAL CANCER: ICD-10-CM

## 2024-10-09 DIAGNOSIS — E78.00 HYPERCHOLESTEREMIA: ICD-10-CM

## 2024-10-09 DIAGNOSIS — M89.8X9 BONE PAIN: ICD-10-CM

## 2024-10-09 PROCEDURE — 99396 PREV VISIT EST AGE 40-64: CPT | Performed by: FAMILY MEDICINE

## 2024-10-09 RX ORDER — IBUPROFEN 800 MG/1
800 TABLET, FILM COATED ORAL 3 TIMES WEEKLY
Qty: 90 TABLET | Refills: 0 | Status: SHIPPED | OUTPATIENT
Start: 2024-10-09

## 2024-10-09 RX ORDER — METFORMIN HCL 500 MG
500 TABLET, EXTENDED RELEASE 24 HR ORAL
Qty: 90 TABLET | Refills: 1 | Status: SHIPPED | OUTPATIENT
Start: 2024-10-09 | End: 2024-10-09 | Stop reason: SDUPTHER

## 2024-10-09 NOTE — PROGRESS NOTES
Adult Annual Physical  Name: Margie Neil      : 1970      MRN: [id number]  Encounter Provider: Giuseppe Ford DO  Encounter Date: 10/9/2024   Encounter department: [facility]    Assessment & Plan  Type 2 diabetes mellitus with hyperglycemia, without long-term current use of insulin ([facility])    Lab Results   Component Value Date    HGBA1C 6.8 (H) 10/24/2023       Orders:    POCT hemoglobin A1c    Ambulatory Referral to Ophthalmology; Future    POCT urine microalbumin/creatinine    Hemoglobin A1C; Future    Albumin / creatinine urine ratio; Future    metFORMIN (GLUCOPHAGE-XR) 500 mg 24 hr tablet; Take 1 tablet (500 mg total) by mouth daily with breakfast    Screening for cervical cancer    Orders:    Ambulatory referral to Obstetrics / Gynecology; Future    Annual physical exam         Mild intermittent asthma without complication         Hypercholesteremia    Orders:    Lipid panel; Future    Bone pain    Orders:    ibuprofen (MOTRIN) 800 mg tablet; Take 1 tablet (800 mg total) by mouth 3 (three) times a week    CHAUNCEY Screen w/ Reflex to Titer/Pattern; Future    RF Screen w/ Reflex to Titer; Future    Sedimentation rate, automated; Future    Mass of upper inner quadrant of left breast    Orders:    Ambulatory Referral to Dermatology; Future    Anemia    Orders:    ferrous sulfate 325 (65 Fe) mg tablet; Take 1 tablet (325 mg total) by mouth daily    Immunizations and preventive care screenings were discussed with patient today. Appropriate education was printed on patient's after visit summary.    Counseling:  Alcohol/drug use: discussed moderation in alcohol intake, the recommendations for healthy alcohol use, and avoidance of illicit drug use.  Dental Health: discussed importance of regular tooth brushing, flossing, and dental visits.  Injury prevention: discussed safety/seat belts, safety helmets, smoke detectors, carbon monoxide detectors, and smoking near bedding or upholstery.  Sexual  health: discussed sexually transmitted diseases, partner selection, use of condoms, avoidance of unintended pregnancy, and contraceptive alternatives.  Exercise: the importance of regular exercise/physical activity was discussed. Recommend exercise 3-5 times per week for at least 30 minutes.   30      Diabetic Foot Exam    Patient's shoes and socks removed.    Right Foot/Ankle   Right Foot Inspection  Skin Exam: skin normal, skin intact and dry skin. No warmth, no callus, no erythema, no maceration, no abnormal color, no pre-ulcer, no ulcer and no callus.     Toe Exam: ROM and strength within normal limits. No swelling, no tenderness, erythema and  no right toe deformity    Sensory   Vibration: intact  Proprioception: intact  Monofilament testing: intact    Vascular  Capillary refills: < 3 seconds  The right DP pulse is 1+. The right PT pulse is 1+.     Left Foot/Ankle  Left Foot Inspection  Skin Exam: skin normal, skin intact and dry skin. No warmth, no erythema, no maceration, normal color, no pre-ulcer, no ulcer and no callus.     Toe Exam: ROM and strength within normal limits. No swelling, no tenderness, no erythema and no left toe deformity.     Sensory   Vibration: intact  Proprioception: intact  Monofilament testing: intact    Vascular  Capillary refills: < 3 seconds  The left DP pulse is 2+. The left PT pulse is 2+.     Assign Risk Category  No deformity present  No loss of protective sensation  No weak pulses  Risk: 0           History of Present Illness     Adult Annual Physical:  Patient presents for annual physical. Annual, review labs.  Hx of RAD with use of nebulizer..     Diet and Physical Activity:  - Diet/Nutrition: poor diet. No teeth.  - Exercise: no formal exercise.    General Health:  - Sleep: 4-6 hours of sleep on average.  - Hearing: normal hearing right ear.  - Vision: wears glasses.  - Dental: regular dental visits.    /GYN Health:  - Follows with GYN: yes.   - Menopause: perimenopausal.    - History of STDs: no    Advanced Care Planning:  - Has an advanced directive?: no    - Has a durable medical POA?: yes    - ACP document given to patient?: no      Review of Systems   Constitutional: Negative.    HENT: Negative.     Eyes: Negative.    Respiratory: Negative.     Cardiovascular: Negative.    Gastrointestinal: Negative.    Genitourinary: Negative.    Musculoskeletal: Negative.    Skin: Negative.    Neurological: Negative.    Psychiatric/Behavioral: Negative.           Objective     /76 (BP Location: Left arm, Patient Position: Sitting, Cuff Size: Large)   Pulse 96   Temp (!) 96.5 °F (35.8 °C) (Temporal)   Ht 5' (1.524 m)   Wt 115 kg (253 lb 9.6 oz)   SpO2 98%   BMI 49.53 kg/m²     Physical Exam  Constitutional:       Appearance: She is well-developed.   HENT:      Head: Normocephalic and atraumatic.      Right Ear: External ear normal.      Left Ear: External ear normal.      Nose: Nose normal.      Mouth/Throat:      Mouth: Mucous membranes are moist.      Pharynx: Oropharynx is clear.   Eyes:      Conjunctiva/sclera: Conjunctivae normal.      Pupils: Pupils are equal, round, and reactive to light.   Cardiovascular:      Rate and Rhythm: Normal rate and regular rhythm.      Pulses: no weak pulses.           Dorsalis pedis pulses are 1+ on the right side and 2+ on the left side.        Posterior tibial pulses are 1+ on the right side and 2+ on the left side.      Heart sounds: Normal heart sounds.   Pulmonary:      Effort: Pulmonary effort is normal.      Breath sounds: Normal breath sounds.   Abdominal:      General: Bowel sounds are normal.      Palpations: Abdomen is soft.   Musculoskeletal:      Cervical back: Normal range of motion and neck supple.   Feet:      Right foot:      Skin integrity: Dry skin present. No ulcer, skin breakdown, erythema, warmth or callus.      Left foot:      Skin integrity: Dry skin present. No ulcer, skin breakdown, erythema, warmth or callus.   Skin:      General: Skin is warm and dry.   Neurological:      Mental Status: She is alert and oriented to person, place, and time.      Deep Tendon Reflexes: Reflexes are normal and symmetric.   Psychiatric:         Behavior: Behavior normal.         Thought Content: Thought content normal.         Judgment: Judgment normal.

## 2024-10-09 NOTE — ASSESSMENT & PLAN NOTE
Lab Results   Component Value Date    HGBA1C 6.8 (H) 10/24/2023       Orders:    POCT hemoglobin A1c    Ambulatory Referral to Ophthalmology; Future    POCT urine microalbumin/creatinine    Hemoglobin A1C; Future    Albumin / creatinine urine ratio; Future    metFORMIN (GLUCOPHAGE-XR) 500 mg 24 hr tablet; Take 1 tablet (500 mg total) by mouth daily with breakfast

## 2024-10-11 RX ORDER — METFORMIN HCL 500 MG
500 TABLET, EXTENDED RELEASE 24 HR ORAL
Qty: 90 TABLET | Refills: 1 | Status: SHIPPED | OUTPATIENT
Start: 2024-10-11

## 2024-10-11 RX ORDER — FERROUS SULFATE 325(65) MG
325 TABLET ORAL DAILY
Qty: 30 TABLET | Refills: 0 | Status: SHIPPED | OUTPATIENT
Start: 2024-10-11

## 2024-10-21 DIAGNOSIS — D64.9 ANEMIA: ICD-10-CM

## 2024-10-23 RX ORDER — FERROUS SULFATE 325(65) MG
325 TABLET ORAL DAILY
Qty: 30 TABLET | Refills: 0 | Status: SHIPPED | OUTPATIENT
Start: 2024-10-23

## 2024-10-24 ENCOUNTER — TELEPHONE (OUTPATIENT)
Age: 54
End: 2024-10-24

## 2024-10-24 LAB — SL AMB POCT HEMOGLOBIN AIC: 6.2 (ref ?–6.5)

## 2024-10-24 NOTE — TELEPHONE ENCOUNTER
Pharmacy called and would like the patients meds sent to them as the patient has changed.  I advised we would not to reach out to the patient first.

## 2024-10-24 NOTE — TELEPHONE ENCOUNTER
From chart review it look like on 10/9/2024 ibuprofen was sent in and on 10/11/2024 Metformin was sent in to [facility] pharmacy.

## 2024-10-30 ENCOUNTER — CONSULT (OUTPATIENT)
Dept: MULTI SPECIALTY CLINIC | Facility: CLINIC | Age: 54
End: 2024-10-30

## 2024-10-30 VITALS — TEMPERATURE: 97.3 F | HEIGHT: 60 IN | WEIGHT: 258 LBS | BODY MASS INDEX: 50.65 KG/M2

## 2024-10-30 DIAGNOSIS — L72.0 EIC (EPIDERMAL INCLUSION CYST): Primary | ICD-10-CM

## 2024-10-30 DIAGNOSIS — N63.22 MASS OF UPPER INNER QUADRANT OF LEFT BREAST: ICD-10-CM

## 2024-10-30 DIAGNOSIS — L72.3 INFLAMED EPIDERMOID CYST OF SKIN: ICD-10-CM

## 2024-10-30 PROCEDURE — 10060 I&D ABSCESS SIMPLE/SINGLE: CPT | Performed by: DERMATOLOGY

## 2024-10-30 PROCEDURE — 87205 SMEAR GRAM STAIN: CPT

## 2024-10-30 PROCEDURE — 99204 OFFICE O/P NEW MOD 45 MIN: CPT | Performed by: DERMATOLOGY

## 2024-10-30 PROCEDURE — 87077 CULTURE AEROBIC IDENTIFY: CPT

## 2024-10-30 PROCEDURE — 87070 CULTURE OTHR SPECIMN AEROBIC: CPT

## 2024-10-30 RX ORDER — CEPHALEXIN 500 MG/1
500 CAPSULE ORAL EVERY 12 HOURS SCHEDULED
Qty: 20 CAPSULE | Refills: 0 | Status: SHIPPED | OUTPATIENT
Start: 2024-10-30 | End: 2024-11-09

## 2024-10-30 NOTE — PROGRESS NOTES
"[facility] Dermatology Clinic Note     Patient Name: Margie Neil  Encounter Date: 10/30/24     Have you been cared for by a [facility] Dermatologist in the last 3 years and, if so, which description applies to you?    NO.   I am considered a \"new\" patient and must complete all patient intake questions. I am FEMALE/of child-bearing potential.    REVIEW OF SYSTEMS:  Have you recently had or currently have any of the following? Recent fever or chills? No  Any non-healing wound? No  Are you pregnant or planning to become pregnant? No  Are you currently or planning to be nursing or breast feeding? No   PAST MEDICAL HISTORY:  Have you personally ever had or currently have any of the following?  If \"YES,\" then please provide more detail. Skin cancer (such as Melanoma, Basal Cell Carcinoma, Squamous Cell Carcinoma?  No  Tuberculosis, HIV/AIDS, Hepatitis B or C: No  Radiation Treatment No   HISTORY OF IMMUNOSUPPRESSION:   Do you have a history of any of the following:  Systemic Immunosuppression such as Diabetes, Biologic or Immunotherapy, Chemotherapy, Organ Transplantation, Bone Marrow Transplantation or Prednsione?  No    Answering \"YES\" requires the addition of the dotphrase \"IMMUNOSUPPRESSED\" as the first diagnosis of the patient's visit.   FAMILY HISTORY:  Any \"first degree relatives\" (parent, brother, sister, or child) with the following?    Skin Cancer, Pancreatic or Other Cancer? No   PATIENT EXPERIENCE:    Do you want the Dermatologist to perform a COMPLETE skin exam today including a clinical examination under the \"bra and underwear\" areas?  NO  If necessary, do we have your permission to call and leave a detailed message on your Preferred Phone number that includes your specific medical information?  NO      No Known Allergies   Current Outpatient Medications:     Diapers & Supplies (Pampers UnderJams L/XL) MISC, Use in the morning, Disp: 100 each, Rfl: 5    ferrous sulfate 325 (65 Fe) mg tablet, TAKE 1 " "TABLET (325 MG TOTAL) BY MOUTH DAILY, Disp: 30 tablet, Rfl: 0    ibuprofen (MOTRIN) 800 mg tablet, Take 1 tablet (800 mg total) by mouth 3 (three) times a week, Disp: 90 tablet, Rfl: 0    metFORMIN (GLUCOPHAGE-XR) 500 mg 24 hr tablet, Take 1 tablet (500 mg total) by mouth daily with breakfast, Disp: 90 tablet, Rfl: 1          Whom besides the patient is providing clinical information about today's encounter?   Other:   [id number]    Physical Exam and Assessment/Plan by Diagnosis:      INFLAMED/INFECTED EPIDERMAL INCLUSION CYST    Physical Exam:  Anatomic Location: left breast  Morphologic Description:  Firm, flesh-colored to erythematous fluctuant nodule with central punctum Size: 1.5 cm  Inflamed? YES  Painful? YES  Active infection? YES  Pertinent Positives:  Pertinent Negatives:    Additional History of Present Condition:  Present for a \"long time\". Very painful. Patient has not been on any antibiotics. She has never had a similar in the past. She has no fever or chills.   History of pain? YES  History of infection? No  History of colon polyps (genoderm-related)? No  History of multiple cysts (genoderm-related)? No    Plan:  Cysts may remain stable or progressively enlarge over time. Often, cysts go away without any treatment but may return. There are no reliable predictive factors to tell if an epidermal inclusion cyst will enlarge, become inflamed, or remain quiescent. Infected cysts tend to become larger, turn red, and are more noticeable to the patient. There may be accompanying pain and discomfort.  Certain genetic disorders may increase the risk of developing multiple epidermal inclusion cysts. These disorders include Cameron syndrome, pachyonychia congenita type 2, and basal cell nevus syndrome.  In cases where the cyst is not infected or inflamed, treatment is not emergent unless desired by the patient. Home remedies include placing a warm, moist cloth over the area to help the cyst drain and heal. " "DO NOT attempt to squeeze or extract the cyst yourself. Doing so may lead to a worsening infection or scarring.  If fever develops or increased pain, swelling or redness occurs, call the office.     PROCEDURES PERFORMED TODAY ASSOCIATED WITH THIS CONDITION:          Incision and drainage: drained 2 cc of purulent yellow thick fluid with keratin debris mixed in.       Medical Complexity:    SELF-LIMITED OR MINOR PROBLEM.  Problem runs a definite and prescribed course, is transient in nature, and is not likely to permanently alter health status.       XEROSIS (\"DRY SKIN\")    Physical Exam:  Anatomic Location Affected:  upper body  Morphological Description:  xerotic papules and plaques  Pertinent Positives:  Pertinent Negatives:    Additional History of Present Condition:    - Current treatments: coconut oil    Assessment and Plan:  - History and physical consistent with xerosis  - Educated that dry skin is often exacerbated by low humidity conditions and during change of seasons  - Educated that gentle skin care and consistent use of emollients are the mainstay of treatment. Recommended use of occlusive emollient, such as vaseline, aquaphor or aveeno eczema balm. If unable to tolerate, noted that thick white cream is next best.   Based on a thorough discussion of this condition and the management approach to it (including a comprehensive discussion of the known risks, side effects and potential benefits of treatment), the patient (family) agrees to implement the following specific plan:  Start daily emollient to moist skin.  Take luke warm showers (not hot)  Avoid scratching.   Transition to fragrance free, non-irritating skin care as possible.      Tip for relief: If itchy or uncomfortable, placing emollient (like vaseline) in the fridge is a great trick as the cool sensation is soothing.            Merline Blackwell  PGY4 Dermatology Resident    "

## 2024-11-02 LAB
BACTERIA WND AEROBE CULT: ABNORMAL
GRAM STN SPEC: ABNORMAL

## 2024-11-08 ENCOUNTER — TELEPHONE (OUTPATIENT)
Age: 54
End: 2024-11-08

## 2024-11-08 DIAGNOSIS — M89.8X9 BONE PAIN: ICD-10-CM

## 2024-11-08 DIAGNOSIS — M19.90 ARTHRITIS: Primary | ICD-10-CM

## 2024-11-08 RX ORDER — IBUPROFEN 800 MG/1
800 TABLET, FILM COATED ORAL 3 TIMES DAILY PRN
Qty: 30 TABLET | Refills: 3 | Status: SHIPPED | OUTPATIENT
Start: 2024-11-08 | End: 2024-11-14 | Stop reason: SDUPTHER

## 2024-11-08 RX ORDER — IBUPROFEN 800 MG/1
800 TABLET, FILM COATED ORAL 3 TIMES WEEKLY
Qty: 90 TABLET | Refills: 0 | Status: SHIPPED | OUTPATIENT
Start: 2024-11-08 | End: 2024-11-08 | Stop reason: DRUGHIGH

## 2024-11-08 NOTE — TELEPHONE ENCOUNTER
Pharmacy called in regards to wanting clarification on medication instructions for the Ibuprofen. Pharmacist stated that instruction state Take 1 tablet by mouth 3 times a week and pharmacist stated that it usually states take 1 tablet by mouth 3 times a day. Pharmacy would like a call back.

## 2024-11-13 DIAGNOSIS — D64.9 ANEMIA: ICD-10-CM

## 2024-11-13 DIAGNOSIS — M19.90 ARTHRITIS: ICD-10-CM

## 2024-11-13 DIAGNOSIS — E11.65 TYPE 2 DIABETES MELLITUS WITH HYPERGLYCEMIA, WITHOUT LONG-TERM CURRENT USE OF INSULIN (HCC): ICD-10-CM

## 2024-11-13 RX ORDER — IBUPROFEN 800 MG/1
800 TABLET, FILM COATED ORAL 3 TIMES DAILY PRN
Qty: 30 TABLET | Refills: 3 | Status: CANCELLED | OUTPATIENT
Start: 2024-11-13

## 2024-11-13 RX ORDER — METFORMIN HYDROCHLORIDE 500 MG/1
500 TABLET, EXTENDED RELEASE ORAL
Qty: 100 TABLET | Refills: 1 | Status: CANCELLED | OUTPATIENT
Start: 2024-11-13 | End: 2025-06-01

## 2024-11-13 RX ORDER — METFORMIN HYDROCHLORIDE 500 MG/1
500 TABLET, EXTENDED RELEASE ORAL
Qty: 30 TABLET | Refills: 0 | Status: SHIPPED | OUTPATIENT
Start: 2024-11-13 | End: 2024-12-13

## 2024-11-13 RX ORDER — FERROUS SULFATE 325(65) MG
325 TABLET ORAL DAILY
Qty: 100 TABLET | Refills: 1 | Status: CANCELLED | OUTPATIENT
Start: 2024-11-13 | End: 2025-06-01

## 2024-11-14 DIAGNOSIS — M19.90 ARTHRITIS: ICD-10-CM

## 2024-11-15 RX ORDER — IBUPROFEN 800 MG/1
800 TABLET, FILM COATED ORAL 3 TIMES DAILY PRN
Qty: 30 TABLET | Refills: 3 | Status: SHIPPED | OUTPATIENT
Start: 2024-11-15

## 2024-11-15 RX ORDER — FERROUS SULFATE 325(65) MG
325 TABLET ORAL DAILY
Qty: 30 TABLET | Refills: 0 | Status: SHIPPED | OUTPATIENT
Start: 2024-11-15

## 2024-12-02 ENCOUNTER — VBI (OUTPATIENT)
Dept: ADMINISTRATIVE | Facility: OTHER | Age: 54
End: 2024-12-02

## 2024-12-02 NOTE — TELEPHONE ENCOUNTER
12/02/24 10:02 AM     Chart reviewed for Hemoglobin A1c was/were submitted to the patient's insurance.     Debby Rutledge   [facility]

## 2024-12-08 DIAGNOSIS — E11.65 TYPE 2 DIABETES MELLITUS WITH HYPERGLYCEMIA, WITHOUT LONG-TERM CURRENT USE OF INSULIN (HCC): ICD-10-CM

## 2024-12-10 RX ORDER — METFORMIN HYDROCHLORIDE 500 MG/1
500 TABLET, EXTENDED RELEASE ORAL
Qty: 90 TABLET | Refills: 1 | Status: SHIPPED | OUTPATIENT
Start: 2024-12-10 | End: 2025-01-09

## 2024-12-25 DIAGNOSIS — D64.9 ANEMIA: ICD-10-CM

## 2024-12-26 RX ORDER — FERROUS SULFATE 325(65) MG
325 TABLET ORAL DAILY
Qty: 30 TABLET | Refills: 0 | Status: SHIPPED | OUTPATIENT
Start: 2024-12-26

## 2025-02-26 ENCOUNTER — VBI (OUTPATIENT)
Dept: ADMINISTRATIVE | Facility: OTHER | Age: 55
End: 2025-02-26

## 2025-02-26 NOTE — TELEPHONE ENCOUNTER
02/26/25 1:40 PM     Chart reviewed for Pap Smear (HPV) aka Cervical Cancer Screening ; nothing is submitted to the patient's insurance at this time.     Debby Rutledge   [facility] [facility]

## 2025-04-01 DIAGNOSIS — E11.65 TYPE 2 DIABETES MELLITUS WITH HYPERGLYCEMIA, WITHOUT LONG-TERM CURRENT USE OF INSULIN (HCC): Primary | ICD-10-CM

## 2025-04-14 DIAGNOSIS — D64.9 ANEMIA: ICD-10-CM

## 2025-04-16 ENCOUNTER — RA CDI HCC (OUTPATIENT)
Dept: OTHER | Facility: HOSPITAL | Age: 55
End: 2025-04-16

## 2025-04-16 RX ORDER — FERROUS SULFATE 325(65) MG
1 TABLET ORAL DAILY
Qty: 100 TABLET | Refills: 1 | Status: SHIPPED | OUTPATIENT
Start: 2025-04-16

## 2025-04-16 NOTE — PROGRESS NOTES
HCC coding opportunities          Chart Reviewed number of suggestions sent to Provider: 2   F33.0  R32   Refer to 10/16/23 office visit -Please assess and document with MEAT as appropriate.     [facility] AUDIT     Patients Insurance        Commercial Insurance: [employer]

## 2025-06-23 DIAGNOSIS — M19.90 ARTHRITIS: ICD-10-CM

## 2025-06-23 RX ORDER — IBUPROFEN 800 MG/1
TABLET, FILM COATED ORAL
Qty: 30 TABLET | Refills: 0 | Status: SHIPPED | OUTPATIENT
Start: 2025-06-23